# Patient Record
Sex: MALE | Race: WHITE | NOT HISPANIC OR LATINO | Employment: FULL TIME | URBAN - METROPOLITAN AREA
[De-identification: names, ages, dates, MRNs, and addresses within clinical notes are randomized per-mention and may not be internally consistent; named-entity substitution may affect disease eponyms.]

---

## 2019-11-01 ENCOUNTER — APPOINTMENT (EMERGENCY)
Dept: RADIOLOGY | Facility: HOSPITAL | Age: 34
End: 2019-11-01
Payer: COMMERCIAL

## 2019-11-01 ENCOUNTER — HOSPITAL ENCOUNTER (EMERGENCY)
Facility: HOSPITAL | Age: 34
Discharge: HOME | End: 2019-11-01
Attending: EMERGENCY MEDICINE | Admitting: EMERGENCY MEDICINE
Payer: COMMERCIAL

## 2019-11-01 VITALS
WEIGHT: 240 LBS | TEMPERATURE: 98 F | HEIGHT: 73 IN | OXYGEN SATURATION: 99 % | SYSTOLIC BLOOD PRESSURE: 159 MMHG | BODY MASS INDEX: 31.81 KG/M2 | DIASTOLIC BLOOD PRESSURE: 101 MMHG | HEART RATE: 78 BPM | RESPIRATION RATE: 18 BRPM

## 2019-11-01 DIAGNOSIS — M62.838 MUSCLE SPASM: ICD-10-CM

## 2019-11-01 DIAGNOSIS — M25.512 LEFT SHOULDER PAIN: Primary | ICD-10-CM

## 2019-11-01 PROCEDURE — 73030 X-RAY EXAM OF SHOULDER: CPT

## 2019-11-01 PROCEDURE — 99283 EMERGENCY DEPT VISIT LOW MDM: CPT

## 2019-11-01 RX ORDER — METHOCARBAMOL 500 MG/1
500 TABLET, FILM COATED ORAL 2 TIMES DAILY
Qty: 20 TABLET | Refills: 0 | Status: SHIPPED | OUTPATIENT
Start: 2019-11-01 | End: 2020-08-18

## 2019-11-01 RX ORDER — IBUPROFEN 800 MG/1
800 TABLET ORAL 3 TIMES DAILY
Qty: 21 TABLET | Refills: 0 | Status: SHIPPED | OUTPATIENT
Start: 2019-11-01 | End: 2020-08-18

## 2019-11-01 NOTE — ED PROVIDER NOTES
History  Chief Complaint   Patient presents with    Shoulder Pain     unsure of injury, for a few weeks, LEFT side      Lt shoulder pain x 3 weeks has FROM + pulses sensation intact + pulses brisk cap refill, + lt trap spasm, mild tenderness on palpation over a/c and g/h joint areas, no ecchymosis noted          None       History reviewed  No pertinent past medical history  Past Surgical History:   Procedure Laterality Date    HAND TENDON SURGERY         Family History   Problem Relation Age of Onset    Cirrhosis Father     Cirrhosis Maternal Grandfather      I have reviewed and agree with the history as documented  Social History     Tobacco Use    Smoking status: Current Every Day Smoker     Packs/day: 1 00     Types: Cigarettes    Smokeless tobacco: Never Used   Substance Use Topics    Alcohol use: No    Drug use: No        Review of Systems   Musculoskeletal:        Lt shoulder pain       Physical Exam  Physical Exam   Constitutional: He is oriented to person, place, and time  He appears well-developed and well-nourished  HENT:   Head: Normocephalic and atraumatic  Eyes: Pupils are equal, round, and reactive to light  Conjunctivae and EOM are normal    Neck: Normal range of motion  Neck supple    + lt trap spasm   Musculoskeletal: Normal range of motion  He exhibits tenderness  FROM + pulses sensation intact + pulses brisk cap refill, + lt trap spasm, mild tenderness on palpation over a/c and g/h joint areas, no ecchymosis noted     Neurological: He is alert and oriented to person, place, and time  Skin: Skin is warm and dry  Capillary refill takes less than 2 seconds  Psychiatric: He has a normal mood and affect  Nursing note and vitals reviewed        Vital Signs  ED Triage Vitals [11/01/19 1843]   Temperature Pulse Respirations Blood Pressure SpO2   98 °F (36 7 °C) 78 18 (!) 159/101 99 %      Temp Source Heart Rate Source Patient Position - Orthostatic VS BP Location FiO2 (%) Temporal Monitor Sitting Left arm --      Pain Score       7           Vitals:    11/01/19 1843   BP: (!) 159/101   Pulse: 78   Patient Position - Orthostatic VS: Sitting         Visual Acuity      ED Medications  Medications - No data to display    Diagnostic Studies  Results Reviewed     None                 XR shoulder 2+ views LEFT    (Results Pending)              Procedures  Splint application  Date/Time: 11/1/2019 7:04 PM  Performed by: NICOLE Savage  Authorized by: Ruddy Savage [facility]      Patient location:  ED  Procedure performed by emergency physician: No    Other Assisting Provider: No    Consent:     Consent obtained:  Verbal    Consent given by:  Patient    Risks discussed:  Swelling, pain and numbness    Alternatives discussed:  No treatment  Universal protocol:     Patient identity confirmed:  Verbally with patient  Indication:     Indications: other medical problem (comment)    Pre-procedure details:     Sensation:  Normal  Procedure details:     Laterality:  Left    Location:  Shoulder    Shoulder:  L shoulder    Strapping: no      Supplies:  Sling  Post-procedure details:     Pain:  Improved    Sensation:  Normal    Neurovascular Exam: skin pink, capillary refill <2 sec, normal pulses and skin intact, warm, and dry      Patient tolerance of procedure:   Tolerated well, no immediate complications           ED Course  ED Course as of Nov 01 1909 Fri Nov 01, 2019   1905 Outpt mgt with pcp ortho                                  MDM    Disposition  Final diagnoses:   Left shoulder pain   Muscle spasm     Time reflects when diagnosis was documented in both MDM as applicable and the Disposition within this note     Time User Action Codes Description Comment    11/1/2019  7:03 PM Kuldeep Shingles Add [M25 512] Left shoulder pain     11/1/2019  7:04 PM Kuldeep Shingles Add [S98 501] Muscle spasm       ED Disposition     ED Disposition Condition Date/Time Comment    Discharge Stable Fri Nov 1, 2019 7:02 PM Heidi Santos discharge to home/self care  Follow-up Information     Follow up With Specialties Details Why Contact Info    Luis Tsai MD Family Medicine   [address] [address] Zahra Gonzales DO Orthopedic Surgery   [address]  [facility]  [address] [address]  [address] [facility] [address]            Patient's Medications   Discharge Prescriptions    IBUPROFEN (MOTRIN) 800 MG TABLET    Take 1 tablet (800 mg total) by mouth 3 (three) times a day       Start Date: 11/1/2019 End Date: --       Order Dose: 800 mg       Quantity: 21 tablet    Refills: 0    METHOCARBAMOL (ROBAXIN) 500 MG TABLET    Take 1 tablet (500 mg total) by mouth 2 (two) times a day       Start Date: 11/1/2019 End Date: --       Order Dose: 500 mg       Quantity: 20 tablet    Refills: 0     No discharge procedures on file      ED Provider  Electronically Signed by           NICOLE Montes De Oca  11/01/19 6 [address]   11/01/19 1904

## 2020-08-15 ENCOUNTER — APPOINTMENT (EMERGENCY)
Dept: RADIOLOGY | Facility: HOSPITAL | Age: 35
End: 2020-08-15
Payer: COMMERCIAL

## 2020-08-15 ENCOUNTER — HOSPITAL ENCOUNTER (EMERGENCY)
Facility: HOSPITAL | Age: 35
Discharge: HOME | End: 2020-08-15
Attending: EMERGENCY MEDICINE | Admitting: EMERGENCY MEDICINE
Payer: COMMERCIAL

## 2020-08-15 VITALS
SYSTOLIC BLOOD PRESSURE: 128 MMHG | TEMPERATURE: 97.5 F | WEIGHT: 260 LBS | RESPIRATION RATE: 16 BRPM | OXYGEN SATURATION: 99 % | BODY MASS INDEX: 34.46 KG/M2 | DIASTOLIC BLOOD PRESSURE: 70 MMHG | HEIGHT: 73 IN | HEART RATE: 74 BPM

## 2020-08-15 DIAGNOSIS — M67.89 EXTENSOR TENDON DISRUPTION: Primary | ICD-10-CM

## 2020-08-15 PROCEDURE — 99283 EMERGENCY DEPT VISIT LOW MDM: CPT

## 2020-08-15 PROCEDURE — 99284 EMERGENCY DEPT VISIT MOD MDM: CPT | Performed by: EMERGENCY MEDICINE

## 2020-08-15 PROCEDURE — 73130 X-RAY EXAM OF HAND: CPT

## 2020-08-15 RX ORDER — OXYCODONE HYDROCHLORIDE AND ACETAMINOPHEN 5; 325 MG/1; MG/1
1 TABLET ORAL ONCE
Status: COMPLETED | OUTPATIENT
Start: 2020-08-15 | End: 2020-08-15

## 2020-08-15 RX ADMIN — OXYCODONE HYDROCHLORIDE AND ACETAMINOPHEN 1 TABLET: 5; 325 TABLET ORAL at 23:45

## 2020-08-16 NOTE — ED PROVIDER NOTES
History  Chief Complaint   Patient presents with    Hand Pain     Left 5th digit     This is a 68-year-old male who reports left pinky pain distally which started just prior to his arrival in the emergency department  Patient states he was making a balled fist when he suddenly experienced pain  He rates it as initially severe although currently mild to moderate  Patient notes there was some crepitus with the initial injury happened  At worse with movement, better with rest and extension  He has never had anything like this before in this finger but does note that he has had previous tendon injury secondary to a a major laceration of his hand and forearm  Prior to Admission Medications   Prescriptions Last Dose Informant Patient Reported? Taking?   ibuprofen (MOTRIN) 800 mg tablet   No No   Sig: Take 1 tablet (800 mg total) by mouth 3 (three) times a day   methocarbamol (ROBAXIN) 500 mg tablet Not Taking at Unknown time  No No   Sig: Take 1 tablet (500 mg total) by mouth 2 (two) times a day   Patient not taking: Reported on 8/15/2020      Facility-Administered Medications: None       History reviewed  No pertinent past medical history  Past Surgical History:   Procedure Laterality Date    HAND TENDON SURGERY         Family History   Problem Relation Age of Onset    Cirrhosis Father     Cirrhosis Maternal Grandfather      I have reviewed and agree with the history as documented  E-Cigarette/Vaping    E-Cigarette Use Never User      E-Cigarette/Vaping Substances    Nicotine No     THC No     CBD No     Flavoring No     Other No     Unknown No      Social History     Tobacco Use    Smoking status: Current Every Day Smoker     Packs/day: 1 00     Types: Cigarettes    Smokeless tobacco: Never Used   Substance Use Topics    Alcohol use:  Yes     Alcohol/week: 36 0 standard drinks     Types: 36 Cans of beer per week     Frequency: Monthly or less     Drinks per session: 5 or 6     Binge frequency: Less than monthly    Drug use: No       Review of Systems   Constitutional: Negative for chills and fever  Eyes: Negative for visual disturbance  Respiratory: Negative for shortness of breath  Cardiovascular: Negative for chest pain  Gastrointestinal: Negative for abdominal distention and abdominal pain  Endocrine: Negative for polyuria  Genitourinary: Negative for decreased urine volume and dysuria  Neurological: Negative for dizziness, syncope and weakness  Physical Exam  Physical Exam  Constitutional:       General: He is not in acute distress  Appearance: He is well-developed  HENT:      Head: Normocephalic and atraumatic  Eyes:      Conjunctiva/sclera: Conjunctivae normal       Pupils: Pupils are equal, round, and reactive to light  Neck:      Musculoskeletal: Normal range of motion and neck supple  Cardiovascular:      Rate and Rhythm: Normal rate and regular rhythm  Heart sounds: Normal heart sounds  Pulmonary:      Effort: Pulmonary effort is normal  No respiratory distress  Breath sounds: Normal breath sounds  No stridor  No wheezing, rhonchi or rales  Abdominal:      General: Bowel sounds are normal       Palpations: Abdomen is soft  Musculoskeletal: Normal range of motion  Comments: Deformity of the pinky which appears to be consistent with an extensor tendon injury  Patient is unable to extend the 5th digit on the left hand fully  Is still able to flex without too much difficulty  That does cause some pain so there is some limitation due to that  Full sensation to touch and cap refill less than 2 seconds on the left hand  Skin:     General: Skin is warm and dry  Neurological:      Mental Status: He is alert and oriented to person, place, and time  Psychiatric:         Behavior: Behavior normal          Thought Content:  Thought content normal          Judgment: Judgment normal          Vital Signs  ED Triage Vitals [08/15/20 2220] Temperature Pulse Respirations Blood Pressure SpO2   (!) 97 4 °F (36 3 °C) 80 16 131/71 99 %      Temp Source Heart Rate Source Patient Position - Orthostatic VS BP Location FiO2 (%)   Temporal Monitor Sitting Right arm --      Pain Score       2           Vitals:    08/15/20 2220 08/15/20 2355   BP: 131/71 128/70   Pulse: 80 74   Patient Position - Orthostatic VS: Sitting Sitting         Visual Acuity      ED Medications  Medications   oxyCODONE-acetaminophen (PERCOCET) 5-325 mg per tablet 1 tablet (1 tablet Oral Given 8/15/20 2345)       Diagnostic Studies  Results Reviewed     None                 XR hand 3+ views LEFT   ED Interpretation by Lindsay Burnett MD (08/15 2330)   naf                 Procedures  Procedures         ED Course       US AUDIT      Most Recent Value   Initial Alcohol Screen: US AUDIT-C    1  How often do you have a drink containing alcohol? 1 Filed at: 08/15/2020 2228   2  How many drinks containing alcohol do you have on a typical day you are drinking? 4 Filed at: 08/15/2020 2228   3a  Male UNDER 65: How often do you have five or more drinks on one occasion? 1 Filed at: 08/15/2020 2228   Audit-C Score  6 Filed at: 08/15/2020 2228   Full Alcohol Screen: US AUDIT   4  How often during the last year have you found that you were not able to stop drinking once you had started? 0 Filed at: 08/15/2020 2228   5  How often during past year have you failed to do what was normally expected of you because of drinking? 0 Filed at: 08/15/2020 2228   6  How often in past year have you needed a first drink in the morning to get yourself going after a heavy drinking session? 0 Filed at: 08/15/2020 2228   7  How often in past year have you had feeling of guilt or remorse after drinking? 0 Filed at: 08/15/2020 2228   8  How often in past year have you been unable to remember what happened night before because you had been drinking? 0 Filed at: 08/15/2020 2228   9   Have you or someone else been injured as a result of your drinking? 0 Filed at: 08/15/2020 2228   10  Has a relative, friend, doctor or other health worker been concerned about your drinking and suggested you cut down?   0 Filed at: 08/15/2020 2228   AUDIT Total Score  6 Filed at: 08/15/2020 2228                                            [facility]  Number of Diagnoses or Management Options  Extensor tendon disruption: new and requires workup  Diagnosis management comments: This is a 30-year-old man with a probable extensor tendon disruption  Case discussed with orthopedics on-call through [facility]  Dr Mary Galeano is group agreed that patient can be seen outpatient on Monday 1st thing in the morning  Patient was given a splint  At patient's pain controlled with 1 Percocet  Patient appeared clinically stable time of discharge  Repeatedly discussed importance of very close follow-up with orthopedics as well as giving him a number to contact for Dr Mary Galeano group  Discussed warning signs and symptoms with the patient as well as when to return to the emergency department versus follow up with PC P  Patient states understanding and agreement with the plan  Amount and/or Complexity of Data Reviewed  Tests in the radiology section of CPT®: reviewed and ordered          Disposition  Final diagnoses:   Extensor tendon disruption     Time reflects when diagnosis was documented in both MDM as applicable and the Disposition within this note     Time User Action Codes Description Comment    8/15/2020 11:33 PM Chrystal Perez Add [V92 95] Extensor tendon disruption       ED Disposition     ED Disposition Condition Date/Time Comment    Discharge Stable Sat Aug 15, 2020 11:33 PM Mickeal Velma discharge to home/self care              Follow-up Information     Follow up With Specialties Details Why Contact Info    Alexander Flores MD Orthopedic Surgery Call in 1 day  [address]  [phone]            Discharge Medication List as of 8/15/2020 11:35 PM      CONTINUE these medications which have NOT CHANGED    Details   ibuprofen (MOTRIN) 800 mg tablet Take 1 tablet (800 mg total) by mouth 3 (three) times a day, Starting Fri 11/1/2019, Normal      methocarbamol (ROBAXIN) 500 mg tablet Take 1 tablet (500 mg total) by mouth 2 (two) times a day, Starting Fri 11/1/2019, Normal           No discharge procedures on file      PDMP Review     None          ED Provider  Electronically Signed by           Thomas Conklin MD  08/16/20 2035

## 2020-08-17 ENCOUNTER — TELEPHONE (OUTPATIENT)
Dept: OBGYN CLINIC | Facility: HOSPITAL | Age: 35
End: 2020-08-17

## 2020-08-17 NOTE — TELEPHONE ENCOUNTER
Patient is scheduled to be seen tomorrow with Dr Angela Zhao, he is asking if we are able to fax over on a letter head a request for this patients medical records relating his prior surgery with them  He was advised by [facility] that is the only way he can get this ASA        Fax# [phone]

## 2020-08-17 NOTE — TELEPHONE ENCOUNTER
Patient is scheduled to be seen by Dr Nubia Gama tomorrow in the office for a left hand tendon disruption  He stated that he did have previous surgery to the area about 6-7 years ago but is not sure if he is able to obtain the records before he is going to be seen  The patient is scheduled at 8:30 am on 8/18 is this ok?

## 2020-08-18 ENCOUNTER — OFFICE VISIT (OUTPATIENT)
Dept: OBGYN CLINIC | Facility: CLINIC | Age: 35
End: 2020-08-18
Payer: COMMERCIAL

## 2020-08-18 ENCOUNTER — OFFICE VISIT (OUTPATIENT)
Dept: OCCUPATIONAL THERAPY | Facility: CLINIC | Age: 35
End: 2020-08-18
Payer: COMMERCIAL

## 2020-08-18 VITALS
WEIGHT: 252.6 LBS | HEART RATE: 68 BPM | DIASTOLIC BLOOD PRESSURE: 84 MMHG | SYSTOLIC BLOOD PRESSURE: 138 MMHG | BODY MASS INDEX: 34.21 KG/M2 | HEIGHT: 72 IN

## 2020-08-18 DIAGNOSIS — M66.242 SAGITTAL BAND RUPTURE, EXTENSOR TENDON, NONTRAUMATIC, LEFT: Primary | ICD-10-CM

## 2020-08-18 PROCEDURE — L3933 FO W/O JOINTS CF: HCPCS | Performed by: OCCUPATIONAL THERAPIST

## 2020-08-18 PROCEDURE — 99203 OFFICE O/P NEW LOW 30 MIN: CPT | Performed by: ORTHOPAEDIC SURGERY

## 2020-08-18 RX ORDER — IBUPROFEN 600 MG/1
600 TABLET ORAL EVERY 6 HOURS PRN
Qty: 30 TABLET | Refills: 0 | Status: SHIPPED | OUTPATIENT
Start: 2020-08-18 | End: 2020-12-10 | Stop reason: ALTCHOICE

## 2020-08-18 RX ORDER — CYCLOBENZAPRINE HCL 10 MG
TABLET ORAL
COMMUNITY
End: 2020-08-18

## 2020-08-18 NOTE — LETTER
August 18, 2020     Patient: Raven Bond   YOB: 1985   Date of Visit: 8/18/2020       To Whom it May Concern:    Raven Bond is under my professional care  He was seen in my office on 8/18/2020  He may work with limited use of left hand  Pt must maintain splint no heavy lifting pushing pulling until cleared by physician  If you have any questions or concerns, please don't hesitate to call           Sincerely,          Stephanie Guaman MD        CC: No Recipients

## 2020-08-18 NOTE — PROGRESS NOTES
CHIEF COMPLAINT:  Chief Complaint   Patient presents with    Left Hand - Pain       SUBJECTIVE:  Edgard Daugherty is a 37y o  year old  male who presents to the office for evaluation of his left small finger  Pt had old injury to the dorsal aspect of his of his left hand from a chain saw injury  Patient states that he had 6 surgeries to repair patient states he had significant pain 08/15/2020 when he was making a fist   Patient was seen at the emergency department for evaluation due to his significant pain  Patient has been in splint since emergency department visit 08/15/20  Patient states he continues to have pain in his left small finger at the PIP joint specifically  Patient is taking ibuprofen for pain  PAST MEDICAL HISTORY:  History reviewed  No pertinent past medical history  PAST SURGICAL HISTORY:  Past Surgical History:   Procedure Laterality Date    HAND TENDON SURGERY         FAMILY HISTORY:  Family History   Problem Relation Age of Onset    Cirrhosis Father     Cirrhosis Maternal Grandfather        SOCIAL HISTORY:  Social History     Tobacco Use    Smoking status: Current Every Day Smoker     Packs/day: 1 00     Types: Cigarettes    Smokeless tobacco: Never Used   Substance Use Topics    Alcohol use: Yes     Alcohol/week: 36 0 standard drinks     Types: 36 Cans of beer per week     Frequency: Monthly or less     Drinks per session: 5 or 6     Binge frequency: Less than monthly    Drug use: No       MEDICATIONS:  No current outpatient medications on file  ALLERGIES:  Allergies   Allergen Reactions    Morphine GI Intolerance       REVIEW OF SYSTEMS:  Review of Systems   Constitutional: Negative for chills, fever and unexpected weight change  HENT: Negative for hearing loss, nosebleeds and sore throat  Eyes: Negative for pain, redness and visual disturbance  Respiratory: Negative for cough, shortness of breath and wheezing      Cardiovascular: Negative for chest pain, palpitations and leg swelling  Gastrointestinal: Negative for abdominal pain, nausea and vomiting  Endocrine: Negative for polydipsia and polyuria  Genitourinary: Negative for dysuria and hematuria  Skin: Negative for rash and wound  Neurological: Negative for dizziness, light-headedness and headaches  Psychiatric/Behavioral: Negative for decreased concentration, dysphoric mood and suicidal ideas  The patient is not nervous/anxious          VITALS:  Vitals:    08/18/20 0844   BP: 138/84   Pulse: 68       LABS:  HgA1c: No results found for: HGBA1C  BMP: No results found for: GLUCOSE, CALCIUM, NA, K, CO2, CL, BUN, CREATININE    _____________________________________________________  PHYSICAL EXAMINATION:  General: well developed and well nourished, alert, oriented times 3 and appears comfortable  Psychiatric: Normal  HEENT: Trachea Midline, No torticollis  Pulmonary: No audible wheezing or strider  Cardiovascular: No discernable arrhythmia   Skin: No masses, erythema, lacerations, fluctation, ulcerations  Neurovascular: Sensation Intact to the Median, Ulnar, Radial Nerve, Motor Intact to the Median, Ulnar, Radial Nerve and Pulses Intact    MUSCULOSKELETAL EXAMINATION:  Left hand  Scar from skin graft noted over dorsal aspect of hand  FDS, FDP, and extensors intact  Patient is able to extend against resistance  No disruption of the central slip  Pain at PIP joint with passive flexion, resisted extension  No snapping or obvious subluxation of the lateral bands noted    ___________________________________________________  STUDIES REVIEWED:  X-ray of left hand performed to 08/15/2020 shows no acute osseous abnormalities      PROCEDURES PERFORMED:  Procedures  No Procedures performed today    _____________________________________________________  ASSESSMENT/PLAN:  Left finger pain at the PIP joint with possible subluxation of the lateral bands  * order was placed for PIP extension splint  * patient was wear splint at all times with the exception of hygiene purposes  * note was provided for limited duty at work  * patient will follow up in the office in 3 weeks          Follow Up:  Return in about 3 weeks (around 9/8/2020)          To Do Next Visit:  Re-evaluation of current issue        Scribe Attestation    I,:   Satinder Smith am acting as a scribe while in the presence of the attending physician :        I,:   Jame Huynh MD personally performed the services described in this documentation    as scribed in my presence :

## 2020-08-18 NOTE — PROGRESS NOTES
Orthosis    Diagnosis:   1  Sagittal band rupture, extensor tendon, nontraumatic, left       Indication: Motion Blocking    Location: Left  small finger  Supplies: Custom Fit Orthotic  Orthosis type: PIP extension splint  Wearing Schedule: Remove for hygiene only  Describe Position:  PIP extension; DIP/MCP free per MD    Precautions: Universal (skin contact/breakdown)    Patient or Caregiver expresses understanding of wearing Schedule and Precautions? Yes  Patient or Caregiver able to don/doff orthotic independently? Yes    Written orders provided to patient?  Yes  Orders Obtained: Written  Orders Obtained from:  Dr Michelle Jean    Return for evaluation and treatment No

## 2020-12-10 ENCOUNTER — OFFICE VISIT (OUTPATIENT)
Dept: FAMILY MEDICINE CLINIC | Facility: CLINIC | Age: 35
End: 2020-12-10
Payer: COMMERCIAL

## 2020-12-10 VITALS
DIASTOLIC BLOOD PRESSURE: 96 MMHG | OXYGEN SATURATION: 98 % | HEART RATE: 78 BPM | HEIGHT: 73 IN | WEIGHT: 231.6 LBS | RESPIRATION RATE: 18 BRPM | TEMPERATURE: 98.4 F | SYSTOLIC BLOOD PRESSURE: 142 MMHG | BODY MASS INDEX: 30.69 KG/M2

## 2020-12-10 DIAGNOSIS — F51.05 INSOMNIA DUE TO OTHER MENTAL DISORDER: ICD-10-CM

## 2020-12-10 DIAGNOSIS — F32.1 CURRENT MODERATE EPISODE OF MAJOR DEPRESSIVE DISORDER WITHOUT PRIOR EPISODE (HCC): ICD-10-CM

## 2020-12-10 DIAGNOSIS — R03.0 ELEVATED BP WITHOUT DIAGNOSIS OF HYPERTENSION: ICD-10-CM

## 2020-12-10 DIAGNOSIS — F99 INSOMNIA DUE TO OTHER MENTAL DISORDER: ICD-10-CM

## 2020-12-10 DIAGNOSIS — R45.86 MOOD SWINGS: ICD-10-CM

## 2020-12-10 DIAGNOSIS — Z00.00 ENCOUNTER FOR MEDICAL EXAMINATION TO ESTABLISH CARE: Primary | ICD-10-CM

## 2020-12-10 PROCEDURE — 99203 OFFICE O/P NEW LOW 30 MIN: CPT | Performed by: FAMILY MEDICINE

## 2020-12-10 RX ORDER — FLUOXETINE HYDROCHLORIDE 20 MG/1
20 CAPSULE ORAL DAILY
Qty: 30 CAPSULE | Refills: 2 | Status: SHIPPED | OUTPATIENT
Start: 2020-12-10 | End: 2020-12-23 | Stop reason: ALTCHOICE

## 2020-12-23 ENCOUNTER — TELEMEDICINE (OUTPATIENT)
Dept: FAMILY MEDICINE CLINIC | Facility: CLINIC | Age: 35
End: 2020-12-23
Payer: COMMERCIAL

## 2020-12-23 DIAGNOSIS — F32.1 CURRENT MODERATE EPISODE OF MAJOR DEPRESSIVE DISORDER WITHOUT PRIOR EPISODE (HCC): Primary | ICD-10-CM

## 2020-12-23 PROCEDURE — 99213 OFFICE O/P EST LOW 20 MIN: CPT | Performed by: FAMILY MEDICINE

## 2020-12-23 RX ORDER — CITALOPRAM 20 MG/1
20 TABLET ORAL DAILY
Qty: 30 TABLET | Refills: 2 | Status: SHIPPED | OUTPATIENT
Start: 2020-12-23

## 2021-04-26 ENCOUNTER — TELEMEDICINE (OUTPATIENT)
Dept: FAMILY MEDICINE CLINIC | Facility: CLINIC | Age: 36
End: 2021-04-26
Payer: COMMERCIAL

## 2021-04-26 DIAGNOSIS — B34.9 VIRAL INFECTION, UNSPECIFIED: Primary | ICD-10-CM

## 2021-04-26 DIAGNOSIS — B34.9 VIRAL INFECTION, UNSPECIFIED: ICD-10-CM

## 2021-04-26 PROCEDURE — U0003 INFECTIOUS AGENT DETECTION BY NUCLEIC ACID (DNA OR RNA); SEVERE ACUTE RESPIRATORY SYNDROME CORONAVIRUS 2 (SARS-COV-2) (CORONAVIRUS DISEASE [COVID-19]), AMPLIFIED PROBE TECHNIQUE, MAKING USE OF HIGH THROUGHPUT TECHNOLOGIES AS DESCRIBED BY CMS-2020-01-R: HCPCS | Performed by: PHYSICIAN ASSISTANT

## 2021-04-26 PROCEDURE — G0071 COMM SVCS BY RHC/FQHC 5 MIN: HCPCS | Performed by: FAMILY MEDICINE

## 2021-04-26 PROCEDURE — U0005 INFEC AGEN DETEC AMPLI PROBE: HCPCS | Performed by: PHYSICIAN ASSISTANT

## 2021-04-26 NOTE — PROGRESS NOTES
COVID-19 Outpatient Progress Note    Assessment/Plan:    Problem List Items Addressed This Visit     None      Visit Diagnoses     Viral infection, unspecified    -  Primary    Relevant Orders    Novel Coronavirus (COVID-19), PCR [facility] Collected at [facility] or [facility]         Disposition:     I recommended self-quarantine for 14 days and to call back for worsening symptoms or development of shortness of breath  I referred patient to one of our centralized sites for a COVID-19 swab  COVID test ordered  Advised to continue tylenol/mucinex PRN  Will follow up with results  I have spent 10 minutes directly with the patient  Encounter provider Daniel De Paz PA-C    Provider located at [address] [place] [address] Drive    Recent Visits  No visits were found meeting these conditions  Showing recent visits within past 7 days and meeting all other requirements     Today's Visits  Date Type Provider Dept   04/26/21 Telemedicine MATEUS Blevins  Cln   Showing today's visits and meeting all other requirements     Future Appointments  No visits were found meeting these conditions  Showing future appointments within next 150 days and meeting all other requirements        Patient agrees to participate in a virtual check in via telephone or video visit instead of presenting to the office to address urgent/immediate medical needs  Patient is aware this is a billable service  After connecting through Telephone, the patient was identified by name and date of birth  Cathy Gong was informed that this was a telemedicine visit and that the exam was being conducted confidentially over secure lines  My office door was closed  No one else was in the room  Cathy Gong acknowledged consent and understanding of privacy and security of the telemedicine visit   I informed the patient that I have reviewed his record in [address] [place] [address] and presented the opportunity for him to ask any questions regarding the visit today  The patient agreed to participate  It was my intent to perform this visit via video technology but the patient was not able to do a video connection so the visit was completed via audio telephone only  Subjective:   Salo Cortes is a 31 y o  male who is concerned about COVID-19  Patient's symptoms include fever (99 9), chills, fatigue, nasal congestion, rhinorrhea, sore throat, cough and vomiting (x1)  Patient denies anosmia, loss of taste, shortness of breath, chest tightness, abdominal pain, nausea, diarrhea, myalgias and headaches  Date of symptom onset: 4/24/2021    Exposure:   Contact with a person who is under investigation (PUI) for or who is positive for COVID-19 within the last 14 days?: No    Hospitalized recently for fever and/or lower respiratory symptoms?: No      Currently a healthcare worker that is involved in direct patient care?: No      Works in a special setting where the risk of COVID-19 transmission may be high? (this may include long-term care, correctional and prison facilities; homeless shelters; assisted-living facilities and group homes ): No      Resident in a special setting where the risk of COVID-19 transmission may be high? (this may include long-term care, correctional and prison facilities; homeless shelters; assisted-living facilities and group homes ): No      Patient endorses the above symptoms which began 4/24  He denies known COVID contacts  Has been taking mucinex and tylenol for his symptoms  No results found for: [address] [facility], [address] [facility] [address]  History reviewed  No pertinent past medical history    Past Surgical History:   Procedure Laterality Date    HAND TENDON SURGERY       Current Outpatient Medications   Medication Sig Dispense Refill    citalopram (CeleXA) 20 mg tablet Take 1 tablet (20 mg total) by mouth daily 30 tablet 2     No current facility-administered medications for this visit  Allergies   Allergen Reactions    Morphine GI Intolerance       Review of Systems   Constitutional: Positive for chills, fatigue and fever (99 9)  HENT: Positive for congestion, rhinorrhea and sore throat  Respiratory: Positive for cough  Negative for chest tightness and shortness of breath  Gastrointestinal: Positive for vomiting (x1)  Negative for abdominal pain, diarrhea and nausea  Musculoskeletal: Negative for myalgias  Neurological: Negative for headaches  VIRTUAL VISIT DISCLAIMER    Kimberly Lam acknowledges that he has consented to an online visit or consultation  He understands that the online visit is based solely on information provided by him, and that, in the absence of a face-to-face physical evaluation by the physician, the diagnosis he receives is both limited and provisional in terms of accuracy and completeness  This is not intended to replace a full medical face-to-face evaluation by the physician  Kimberly Lam understands and accepts these terms

## 2021-04-27 LAB — SARS-COV-2 RNA RESP QL NAA+PROBE: NEGATIVE

## 2022-02-02 ENCOUNTER — TELEPHONE (OUTPATIENT)
Dept: FAMILY MEDICINE CLINIC | Facility: CLINIC | Age: 37
End: 2022-02-02

## 2022-05-27 ENCOUNTER — HOSPITAL ENCOUNTER (EMERGENCY)
Facility: HOSPITAL | Age: 37
Discharge: HOME | End: 2022-05-28
Attending: EMERGENCY MEDICINE
Payer: COMMERCIAL

## 2022-05-27 ENCOUNTER — OFFICE VISIT (OUTPATIENT)
Dept: INTERNAL MEDICINE CLINIC | Facility: CLINIC | Age: 37
End: 2022-05-27
Payer: COMMERCIAL

## 2022-05-27 VITALS
BODY MASS INDEX: 32.07 KG/M2 | WEIGHT: 242 LBS | DIASTOLIC BLOOD PRESSURE: 90 MMHG | OXYGEN SATURATION: 98 % | SYSTOLIC BLOOD PRESSURE: 124 MMHG | HEIGHT: 73 IN | TEMPERATURE: 98.4 F | HEART RATE: 114 BPM

## 2022-05-27 VITALS
DIASTOLIC BLOOD PRESSURE: 75 MMHG | BODY MASS INDEX: 31.81 KG/M2 | HEIGHT: 73 IN | RESPIRATION RATE: 18 BRPM | TEMPERATURE: 98 F | WEIGHT: 240 LBS | OXYGEN SATURATION: 98 % | HEART RATE: 72 BPM | SYSTOLIC BLOOD PRESSURE: 140 MMHG

## 2022-05-27 DIAGNOSIS — F19.20 DRUG DEPENDENCE (HCC): Primary | ICD-10-CM

## 2022-05-27 DIAGNOSIS — Z79.899 ENCOUNTER FOR MONITORING SUBOXONE MAINTENANCE THERAPY: ICD-10-CM

## 2022-05-27 DIAGNOSIS — F11.23 OPIATE WITHDRAWAL (HCC): Primary | ICD-10-CM

## 2022-05-27 DIAGNOSIS — Z51.81 ENCOUNTER FOR MONITORING SUBOXONE MAINTENANCE THERAPY: ICD-10-CM

## 2022-05-27 DIAGNOSIS — Z79.899 MEDICATION THERAPY CONTINUED: ICD-10-CM

## 2022-05-27 PROBLEM — F17.200 SMOKER: Status: ACTIVE | Noted: 2022-05-27

## 2022-05-27 LAB
ANION GAP SERPL CALCULATED.3IONS-SCNC: 9 MMOL/L (ref 4–13)
BASOPHILS # BLD AUTO: 0.04 THOUSANDS/ΜL (ref 0–0.1)
BASOPHILS NFR BLD AUTO: 0 % (ref 0–1)
BILIRUB UR QL STRIP: NEGATIVE
BUN SERPL-MCNC: 16 MG/DL (ref 5–25)
CALCIUM SERPL-MCNC: 9.7 MG/DL (ref 8.4–10.2)
CHLORIDE SERPL-SCNC: 102 MMOL/L (ref 96–108)
CLARITY UR: CLEAR
CO2 SERPL-SCNC: 29 MMOL/L (ref 21–32)
COLOR UR: YELLOW
CREAT SERPL-MCNC: 1.29 MG/DL (ref 0.6–1.3)
EOSINOPHIL # BLD AUTO: 0.05 THOUSAND/ΜL (ref 0–0.61)
EOSINOPHIL NFR BLD AUTO: 1 % (ref 0–6)
ERYTHROCYTE [DISTWIDTH] IN BLOOD BY AUTOMATED COUNT: 12.7 % (ref 11.6–15.1)
GFR SERPL CREATININE-BSD FRML MDRD: 71 ML/MIN/1.73SQ M
GLUCOSE SERPL-MCNC: 120 MG/DL (ref 65–140)
GLUCOSE UR STRIP-MCNC: NEGATIVE MG/DL
HCT VFR BLD AUTO: 45.6 % (ref 36.5–49.3)
HGB BLD-MCNC: 14.9 G/DL (ref 12–17)
HGB UR QL STRIP.AUTO: NEGATIVE
IMM GRANULOCYTES # BLD AUTO: 0.03 THOUSAND/UL (ref 0–0.2)
IMM GRANULOCYTES NFR BLD AUTO: 0 % (ref 0–2)
KETONES UR STRIP-MCNC: NEGATIVE MG/DL
LEUKOCYTE ESTERASE UR QL STRIP: NEGATIVE
LYMPHOCYTES # BLD AUTO: 1.46 THOUSANDS/ΜL (ref 0.6–4.47)
LYMPHOCYTES NFR BLD AUTO: 14 % (ref 14–44)
MCH RBC QN AUTO: 29.6 PG (ref 26.8–34.3)
MCHC RBC AUTO-ENTMCNC: 32.7 G/DL (ref 31.4–37.4)
MCV RBC AUTO: 91 FL (ref 82–98)
MONOCYTES # BLD AUTO: 0.59 THOUSAND/ΜL (ref 0.17–1.22)
MONOCYTES NFR BLD AUTO: 6 % (ref 4–12)
NEUTROPHILS # BLD AUTO: 8.45 THOUSANDS/ΜL (ref 1.85–7.62)
NEUTS SEG NFR BLD AUTO: 79 % (ref 43–75)
NITRITE UR QL STRIP: NEGATIVE
NRBC BLD AUTO-RTO: 0 /100 WBCS
PH UR STRIP.AUTO: 7.5 [PH]
PLATELET # BLD AUTO: 245 THOUSANDS/UL (ref 149–390)
PMV BLD AUTO: 9.2 FL (ref 8.9–12.7)
POTASSIUM SERPL-SCNC: 3.6 MMOL/L (ref 3.5–5.3)
PROT UR STRIP-MCNC: NEGATIVE MG/DL
RBC # BLD AUTO: 5.04 MILLION/UL (ref 3.88–5.62)
SODIUM SERPL-SCNC: 140 MMOL/L (ref 135–147)
SP GR UR STRIP.AUTO: 1.01 (ref 1–1.03)
UROBILINOGEN UR QL STRIP.AUTO: 1 E.U./DL
WBC # BLD AUTO: 10.62 THOUSAND/UL (ref 4.31–10.16)

## 2022-05-27 PROCEDURE — 99448 NTRPROF PH1/NTRNET/EHR 21-30: CPT | Performed by: EMERGENCY MEDICINE

## 2022-05-27 PROCEDURE — 99285 EMERGENCY DEPT VISIT HI MDM: CPT | Performed by: EMERGENCY MEDICINE

## 2022-05-27 PROCEDURE — 85025 COMPLETE CBC W/AUTO DIFF WBC: CPT | Performed by: PHYSICIAN ASSISTANT

## 2022-05-27 PROCEDURE — 99203 OFFICE O/P NEW LOW 30 MIN: CPT | Performed by: INTERNAL MEDICINE

## 2022-05-27 PROCEDURE — 81003 URINALYSIS AUTO W/O SCOPE: CPT | Performed by: PHYSICIAN ASSISTANT

## 2022-05-27 PROCEDURE — 96361 HYDRATE IV INFUSION ADD-ON: CPT

## 2022-05-27 PROCEDURE — 99284 EMERGENCY DEPT VISIT MOD MDM: CPT

## 2022-05-27 PROCEDURE — 80048 BASIC METABOLIC PNL TOTAL CA: CPT | Performed by: PHYSICIAN ASSISTANT

## 2022-05-27 PROCEDURE — 96375 TX/PRO/DX INJ NEW DRUG ADDON: CPT

## 2022-05-27 PROCEDURE — 96376 TX/PRO/DX INJ SAME DRUG ADON: CPT

## 2022-05-27 PROCEDURE — 96374 THER/PROPH/DIAG INJ IV PUSH: CPT

## 2022-05-27 PROCEDURE — 36415 COLL VENOUS BLD VENIPUNCTURE: CPT | Performed by: PHYSICIAN ASSISTANT

## 2022-05-27 RX ORDER — CLONIDINE HYDROCHLORIDE 0.1 MG/1
0.2 TABLET ORAL ONCE
Status: COMPLETED | OUTPATIENT
Start: 2022-05-27 | End: 2022-05-27

## 2022-05-27 RX ORDER — NALOXONE HYDROCHLORIDE 4 MG/.1ML
1 SPRAY NASAL AS NEEDED
Qty: 1 EACH | Refills: 1 | Status: SHIPPED | OUTPATIENT
Start: 2022-05-27 | End: 2022-05-31

## 2022-05-27 RX ORDER — CLONIDINE 0.2 MG/24H
0.2 PATCH, EXTENDED RELEASE TRANSDERMAL WEEKLY
Status: DISCONTINUED | OUTPATIENT
Start: 2022-05-27 | End: 2022-05-28 | Stop reason: HOSPADM

## 2022-05-27 RX ORDER — ONDANSETRON 2 MG/ML
4 INJECTION INTRAMUSCULAR; INTRAVENOUS ONCE
Status: DISCONTINUED | OUTPATIENT
Start: 2022-05-27 | End: 2022-05-28 | Stop reason: HOSPADM

## 2022-05-27 RX ORDER — BUPRENORPHINE AND NALOXONE 8; 2 MG/1; MG/1
16 FILM, SOLUBLE BUCCAL; SUBLINGUAL ONCE
Status: COMPLETED | OUTPATIENT
Start: 2022-05-27 | End: 2022-05-27

## 2022-05-27 RX ORDER — HALOPERIDOL 5 MG/ML
2 INJECTION INTRAMUSCULAR ONCE
Status: COMPLETED | OUTPATIENT
Start: 2022-05-27 | End: 2022-05-27

## 2022-05-27 RX ORDER — ONDANSETRON 4 MG/1
4 TABLET, ORALLY DISINTEGRATING ORAL ONCE
Status: COMPLETED | OUTPATIENT
Start: 2022-05-27 | End: 2022-05-27

## 2022-05-27 RX ORDER — LORAZEPAM 2 MG/ML
1 INJECTION INTRAMUSCULAR ONCE
Status: COMPLETED | OUTPATIENT
Start: 2022-05-27 | End: 2022-05-27

## 2022-05-27 RX ORDER — BUPRENORPHINE AND NALOXONE 8; 2 MG/1; MG/1
8 FILM, SOLUBLE BUCCAL; SUBLINGUAL DAILY
Status: DISCONTINUED | OUTPATIENT
Start: 2022-05-28 | End: 2022-05-28 | Stop reason: HOSPADM

## 2022-05-27 RX ORDER — BUPRENORPHINE AND NALOXONE 8; 2 MG/1; MG/1
8 FILM, SOLUBLE BUCCAL; SUBLINGUAL ONCE
Status: DISCONTINUED | OUTPATIENT
Start: 2022-05-27 | End: 2022-05-28 | Stop reason: HOSPADM

## 2022-05-27 RX ORDER — BUPRENORPHINE AND NALOXONE 8; 2 MG/1; MG/1
FILM, SOLUBLE BUCCAL; SUBLINGUAL
Qty: 28 FILM | Refills: 0 | Status: SHIPPED | OUTPATIENT
Start: 2022-05-27

## 2022-05-27 RX ORDER — BUPRENORPHINE AND NALOXONE 8; 2 MG/1; MG/1
FILM, SOLUBLE BUCCAL; SUBLINGUAL
Qty: 2 FILM | Refills: 0 | Status: SHIPPED | OUTPATIENT
Start: 2022-05-27 | End: 2022-05-27

## 2022-05-27 RX ORDER — LORAZEPAM 2 MG/ML
2 INJECTION INTRAMUSCULAR ONCE
Status: COMPLETED | OUTPATIENT
Start: 2022-05-27 | End: 2022-05-27

## 2022-05-27 RX ADMIN — CLONIDINE 0.2 MG: 0.2 PATCH TRANSDERMAL at 17:24

## 2022-05-27 RX ADMIN — ONDANSETRON 4 MG: 4 TABLET, ORALLY DISINTEGRATING ORAL at 15:51

## 2022-05-27 RX ADMIN — LORAZEPAM 2 MG: 2 INJECTION INTRAMUSCULAR; INTRAVENOUS at 21:44

## 2022-05-27 RX ADMIN — HALOPERIDOL LACTATE 2 MG: 5 INJECTION, SOLUTION INTRAMUSCULAR at 20:56

## 2022-05-27 RX ADMIN — SODIUM CHLORIDE 1000 ML: 0.9 INJECTION, SOLUTION INTRAVENOUS at 17:25

## 2022-05-27 RX ADMIN — CLONIDINE HYDROCHLORIDE 0.2 MG: 0.1 TABLET ORAL at 15:38

## 2022-05-27 RX ADMIN — LORAZEPAM 1 MG: 2 INJECTION INTRAMUSCULAR; INTRAVENOUS at 17:29

## 2022-05-27 RX ADMIN — CLONIDINE HYDROCHLORIDE 0.2 MG: 0.1 TABLET ORAL at 16:25

## 2022-05-27 RX ADMIN — BUPRENORPHINE AND NALOXONE 16 MG: 8; 2 FILM BUCCAL; SUBLINGUAL at 19:32

## 2022-05-27 NOTE — PROGRESS NOTES
Assessment/Plan:  Problem List Items Addressed This Visit        Other    Medication therapy continued    Relevant Medications    naloxone (Narcan) 4 mg/0 1 mL nasal spray    buprenorphine-naloxone (Suboxone) 8-2 mg    Other Relevant Orders    [facility] All Prescribed Meds    Amphetamines, Methamphetamines    Butalbital    Phenobarbital    Secobarbital    Temazepam    Alprazolam    Clonazepam    Diazepam    Lorazepam    Oxazepam    Gabapentin    Pregabalin    Cocaine    Heroin    Buprenorphine    Levorphanol    Meperidine    Naltrexone    Fentanyl    Methadone    Oxycodone    Oxymorphone    Tapentadol    Tramadol    Codeine, Hydrocodone, Hydropmorphone, Morphine    Bath Salts    Kratom    Spice    Methylphenidate    Phentermine    Validity Oxidant    Validity Creatinine    Validity pH    Validity Specific    MM DT_Buprenorphine Definitive Test      Other Visit Diagnoses     Drug dependence (HCC)    -  Primary    Relevant Medications    naloxone (Narcan) 4 mg/0 1 mL nasal spray    buprenorphine-naloxone (Suboxone) 8-2 mg    Other Relevant Orders    [facility] All Prescribed Meds    Amphetamines, Methamphetamines    Butalbital    Phenobarbital    Secobarbital    Temazepam    Alprazolam    Clonazepam    Diazepam    Lorazepam    Oxazepam    Gabapentin    Pregabalin    Cocaine    Heroin    Buprenorphine    Levorphanol    Meperidine    Naltrexone    Fentanyl    Methadone    Oxycodone    Oxymorphone    Tapentadol    Tramadol    Codeine, Hydrocodone, Hydropmorphone, Morphine    Bath Salts    Kratom    Spice    Methylphenidate    Phentermine    Validity Oxidant    Validity Creatinine    Validity pH    Validity Specific    MM DT_Buprenorphine Definitive Test    Encounter for monitoring Suboxone maintenance therapy        Relevant Medications    naloxone (Narcan) 4 mg/0 1 mL nasal spray    buprenorphine-naloxone (Suboxone) 8-2 mg    Other Relevant Orders    [facility] All Prescribed Meds    Amphetamines, Methamphetamines Butalbital    Phenobarbital    Secobarbital    Temazepam    Alprazolam    Clonazepam    Diazepam    Lorazepam    Oxazepam    Gabapentin    Pregabalin    Cocaine    Heroin    Buprenorphine    Levorphanol    Meperidine    Naltrexone    Fentanyl    Methadone    Oxycodone    Oxymorphone    Tapentadol    Tramadol    Codeine, Hydrocodone, Hydropmorphone, Morphine    Bath Salts    Kratom    Spice    Methylphenidate    Phentermine    Validity Oxidant    Validity Creatinine    Validity pH    Validity Specific    MM DT_Buprenorphine Definitive Test           Diagnoses and all orders for this visit:    Drug dependence ([facility] [address] )  -     Discontinue: buprenorphine-naloxone (Suboxone) 8-2 mg; Return to the office with the med for dosing   -     naloxone (Narcan) 4 mg/0 1 mL nasal spray; 0 1 mL (4 mg total) into each nostril as needed (respiratory depression or possible OD)  -     [facility] All Prescribed Meds  -     Amphetamines, Methamphetamines  -     Butalbital  -     Phenobarbital  -     Secobarbital  -     Temazepam  -     Alprazolam  -     Clonazepam  -     Diazepam  -     Lorazepam  -     Oxazepam  -     Gabapentin  -     Pregabalin  -     Cocaine  -     Heroin  -     Buprenorphine  -     Levorphanol  -     Meperidine  -     Naltrexone  -     Fentanyl  -     Methadone  -     Oxycodone  -     Oxymorphone  -     Tapentadol  -     Tramadol  -     Codeine, Hydrocodone, Hydropmorphone, Morphine  -     Bath Salts  -     Kratom  -     Spice  -     Methylphenidate  -     Phentermine  -     Validity Oxidant  -     Validity Creatinine  -     Validity pH  -     Validity Specific  -     MM DT_Buprenorphine Definitive Test  -     buprenorphine-naloxone (Suboxone) 8-2 mg; One or two strips sl q day      Encounter for monitoring Suboxone maintenance therapy  -     Discontinue: buprenorphine-naloxone (Suboxone) 8-2 mg; Return to the office with the med for dosing   -     naloxone (Narcan) 4 mg/0 1 mL nasal spray; 0 1 mL (4 mg total) into each nostril as needed (respiratory depression or possible OD)  -     [facility] All Prescribed Meds  -     Amphetamines, Methamphetamines  -     Butalbital  -     Phenobarbital  -     Secobarbital  -     Temazepam  -     Alprazolam  -     Clonazepam  -     Diazepam  -     Lorazepam  -     Oxazepam  -     Gabapentin  -     Pregabalin  -     Cocaine  -     Heroin  -     Buprenorphine  -     Levorphanol  -     Meperidine  -     Naltrexone  -     Fentanyl  -     Methadone  -     Oxycodone  -     Oxymorphone  -     Tapentadol  -     Tramadol  -     Codeine, Hydrocodone, Hydropmorphone, Morphine  -     Bath Salts  -     Kratom  -     Spice  -     Methylphenidate  -     Phentermine  -     Validity Oxidant  -     Validity Creatinine  -     Validity pH  -     Validity Specific  -     MM DT_Buprenorphine Definitive Test  -     buprenorphine-naloxone (Suboxone) 8-2 mg; One or two strips sl q day      Medication therapy continued  -     Discontinue: buprenorphine-naloxone (Suboxone) 8-2 mg; Return to the office with the med for dosing   -     naloxone (Narcan) 4 mg/0 1 mL nasal spray; 0 1 mL (4 mg total) into each nostril as needed (respiratory depression or possible OD)  -     [facility] Med[facility]  -     Amphetamines, Methamphetamines  -     Butalbital  -     Phenobarbital  -     Secobarbital  -     Temazepam  -     Alprazolam  -     Clonazepam  -     Diazepam  -     Lorazepam  -     Oxazepam  -     Gabapentin  -     Pregabalin  -     Cocaine  -     Heroin  -     Buprenorphine  -     Levorphanol  -     Meperidine  -     Naltrexone  -     Fentanyl  -     Methadone  -     Oxycodone  -     Oxymorphone  -     Tapentadol  -     Tramadol  -     Codeine, Hydrocodone, Hydropmorphone, Morphine  -     Bath Salts  -     Kratom  -     Spice  -     Methylphenidate  -     Phentermine  -     Validity Oxidant  -     Validity Creatinine  -     Validity pH  -     Validity Specific  -     MM DT_Buprenorphine Definitive Test  - buprenorphine-naloxone (Suboxone) 8-2 mg; One or two strips sl q day  No problem-specific Assessment & Plan notes found for this encounter  A/P: RTC with the med and was dosed  After about 15 minutes, started to have worsening chills and sweating  Re-confirms that he last used 1100 yesterday and only uses 4-5 bags/day  UDT obtained  Will refer to the ER for IVF"s, zofran and other supportive measures  Will send in 16mg films  Narcan script give to pt to have on hand  Reminded to keep meds safe and our of reach of children  RTC two weeks for f/u  Subjective:      Patient ID: Lani Irvin is a 30 y o  male  WM pt of PA Ms Dennis Carr, presents with his wife to start suboxone  Pt reports orally abusing prescription narcs about ten years ago after suffering a lower back issue  Stopped and was clean for 9 years, but then developed depression and when prescription meds didn't work,turned to nasally abusing herion  Max'd at 4 bags/day  Last used 1100 yesterday  IN withdraw  Denies trouble with the law and no OD's  No detox or counseling  NO IVDA  The following portions of the patient's history were reviewed and updated as appropriate:   He has no past medical history on file  ,  does not have any pertinent problems on file  ,   has a past surgical history that includes Hand tendon surgery  ,  family history includes Alcohol abuse in his father; Cirrhosis in his father and maternal grandfather; Substance Abuse in his mother; Throat cancer in his family  ,   reports that he has been smoking cigarettes  He has a 20 00 pack-year smoking history  He has never used smokeless tobacco  He reports current alcohol use  He reports current drug use  Drug: Heroin  ,  is allergic to morphine     Current Outpatient Medications   Medication Sig Dispense Refill    buprenorphine-naloxone (Suboxone) 8-2 mg One or two strips sl q day   28 Film 0    naloxone (Narcan) 4 mg/0 1 mL nasal spray 0 1 mL (4 mg total) into each nostril as needed (respiratory depression or possible OD) 1 each 1    citalopram (CeleXA) 20 mg tablet Take 1 tablet (20 mg total) by mouth daily (Patient not taking: Reported on 5/27/2022) 30 tablet 2     No current facility-administered medications for this visit  Review of Systems   Constitutional: Positive for activity change, chills, diaphoresis and fatigue  Negative for fever  HENT: Positive for rhinorrhea  Eyes: Negative for visual disturbance  Respiratory: Negative for cough, chest tightness, shortness of breath and wheezing  Cardiovascular: Negative for chest pain, palpitations and leg swelling  Gastrointestinal: Positive for abdominal pain and nausea  Negative for constipation, diarrhea and vomiting  Endocrine: Negative for cold intolerance and heat intolerance  Genitourinary: Negative for difficulty urinating, dysuria and frequency  Musculoskeletal: Negative for arthralgias, gait problem and myalgias  Neurological: Negative for dizziness, seizures, syncope, weakness, light-headedness and headaches  Psychiatric/Behavioral: Negative for confusion, dysphoric mood and sleep disturbance  The patient is not nervous/anxious  PHQ-2/9 Depression Screening          Objective:  Vitals:    05/27/22 1252   BP: 124/90   Pulse: (!) 114   Temp: 98 4 °F (36 9 °C)   TempSrc: Tympanic   SpO2: 98%   Weight: 110 kg (242 lb)   Height: 6' 1" (1 854 m)     Body mass index is 31 93 kg/m²  Physical Exam  Vitals and nursing note reviewed  Constitutional:       General: He is not in acute distress  Appearance: Normal appearance  He is not ill-appearing  HENT:      Head: Normocephalic and atraumatic  Mouth/Throat:      Mouth: Mucous membranes are moist    Eyes:      Extraocular Movements: Extraocular movements intact  Conjunctiva/sclera: Conjunctivae normal       Pupils: Pupils are equal, round, and reactive to light  Neck:      Vascular: No carotid bruit     Cardiovascular: Rate and Rhythm: Normal rate and regular rhythm  Heart sounds: Normal heart sounds  Pulmonary:      Effort: Pulmonary effort is normal  No respiratory distress  Breath sounds: Normal breath sounds  No wheezing or rales  Abdominal:      General: Bowel sounds are normal  There is no distension  Palpations: Abdomen is soft  Tenderness: There is no abdominal tenderness  Musculoskeletal:      Cervical back: Neck supple  Neurological:      General: No focal deficit present  Mental Status: He is alert and oriented to person, place, and time  Mental status is at baseline  Psychiatric:         Mood and Affect: Mood normal          Behavior: Behavior normal          Thought Content: Thought content normal          Judgment: Judgment normal        Scheduled Medication Review:  Pt's scheduled medication use was reviewed by myself/staff via the [employer] website  Pt's use has been found to be appropriate w/o any concerns for misuse by the patient  Pt's current conditions require continued scheduled medication use at this time  Future review for continued appropriate medication use and misuse will continue

## 2022-05-27 NOTE — PATIENT INSTRUCTIONS
Naloxone (Into the nose)   Naloxone Hydrochloride (nal-OX-one radha-droe-KLOR-keisha)  Treats opioid overdose in an emergency situation  Must be given as soon as possible  Brand Name(s): Kloxxado, Narcasya   There may be other brand names for this medicine  When This Medicine Should Not Be Used: This medicine is not right for everyone  Do not use it if you had an allergic reaction to naloxone  How to Use This Medicine:   Spray  Your doctor will tell you how much medicine to use  Do not use more than directed  Your doctor or a pharmacist can tell you where to buy this medicine  It is available without a doctor's order at most major pharmacies  This medicine must be given to you (the patient) by someone else  Talk with people close to you so they know what to do in case of an emergency  Read and follow the patient instructions that come with this medicine  Talk to your doctor or pharmacist if you have any questions  This medicine is for use only in the nose  Do not get any of it in your eyes or on your skin  If it does get on these areas, rinse it off right away  To use:   Each nasal spray contains only one dose of naloxone  Do not prime or test the nasal spray  Hold the nasal spray with your thumb on the bottom of the plunger and your first and middle fingers on either side of the nozzle  Miguel Loge the patient on his or her back  Support the patient's neck with your hand and let the head tilt back  Gently insert the tip of the nozzle into one nostril, until your fingers on either side of the nozzle are against the bottom of the patient's nose  Press the plunger firmly to give the dose  Remove the nasal spray from the patient's nose  Move the patient on his or her side (recovery position)  Get emergency medical help right away  Watch the patient closely  If needed, you may give more doses every 2 to 3 minutes until the patient responds   Use a new nasal spray for each dose and spray the medicine into the other nostril each time  Store the medicine in a closed container at room temperature, away from heat, moisture, and direct light  Do not freeze or expose to excessive heat  If the spray is frozen and is needed in an emergency, do not wait for the spray to thaw  Get medical help right away  However, the spray may be thawed for 15 minutes in room temperature, and it can still be used if it has been thawed after being frozen before  Ask your pharmacist about the best way to dispose of medicine that you do not use  Drugs and Foods to Avoid:      Ask your doctor or pharmacist before using any other medicine, including over-the-counter medicines, vitamins, and herbal products  Warnings While Using This Medicine:   Tell your doctor if you are pregnant or breastfeeding, or if you have heart or blood vessel disease  This medicine should be given right away after a suspected or known overdose of an opioid (narcotic) medicine  This will help prevent serious breathing problems and severe sleepiness that can lead to death  The effects of the opioid medicine may last longer than the effects of the naloxone  This means the breathing problems and sleepiness could come back  Always call for emergency help after the first dose of naloxone  This medicine could cause withdrawal symptoms from the opioid medicine  Keep all medicine out of the reach of children  Never share your medicine with anyone  Possible Side Effects While Using This Medicine:   Call your doctor right away if you notice any of these side effects:   Allergic reaction: Itching or hives, swelling in your face or hands, swelling or tingling in your mouth or throat, chest tightness, trouble breathing  Crying more than usual (in babies)  Diarrhea, nausea, vomiting, stomach cramps or pain  Fast, pounding, or uneven heartbeat  Fever, runny nose, sneezing, sweating, yawning, discomfort in the nose  Lightheadedness, dizziness, fainting  Ongoing trouble breathing  Seizure, shaking, or feeling restless, nervous, or irritable  Unusual tiredness or weakness  If you notice these less serious side effects, talk with your doctor:   Headache  Joint or muscle pain  If you notice other side effects that you think are caused by this medicine, tell your doctor  Call your doctor for medical advice about side effects  You may report side effects to FDA at 1-846-FDA-0068    © Copyright [employer] [facility] 2022 Information is for End User's use only and may not be sold, redistributed or otherwise used for commercial purposes  The above information is an  only  It is not intended as medical advice for individual conditions or treatments  Talk to your doctor, nurse or pharmacist before following any medical regimen to see if it is safe and effective for you  Buprenorphine/Naloxone (Into the mouth)   Buprenorphine (bue-pre-NOR-feen), Naloxone (nal-OX-one)  Treats narcotic dependence  Brand Name(s): Suboxone, Zubsolv   There may be other brand names for this medicine  When This Medicine Should Not Be Used: This medicine is not right for everyone  Do not use it if you had an allergic reaction to buprenorphine or naloxone  How to Use This Medicine: Thin Sheet, Tablet  Take your medicine as directed  Your dose may need to be changed several times to find what works best for you  You must let the medicine dissolve  Never swallow the film or tablet  Your body may not absorb enough of the medicine if you swallow it  Your health caregiver should show you how to use the medicine  If you do not understand, ask for help  It is important to use the medicine correctly  Do not talk while the medicine is inside your mouth  Buccal film: Rinse your mouth with water to moisten it  Place the film against the inside of your cheek  If your doctor told you to use more than 1 film, place the second film inside your other cheek   Do not place more than 2 films inside of 1 cheek at a time  Do not move or touch the film  Do not eat or drink anything until the film is completely dissolved  Sublingual tablet: Place the tablet under your tongue  If your doctor told you to use more than 1 tablet, place all of the tablets in different places under your tongue at the same time  You can use 2 tablets at a time until you have taken all of the medicine, if that is easier for you  Let the tablets dissolve completely in your mouth  Do not eat or drink anything until the tablets are completely dissolved  Sublingual film: Drink some water to help moisten your mouth  Place the film under your tongue  If your doctor told you to use more than 1 film, place the second film on the opposite side from the first one  Do not move the film after you placed it under your tongue  If you are supposed to use more than 2 films, use them the same way, but do not start until the first 2 films are completely dissolved  Do not eat or drink anything until the film is completely dissolved  Do not break, crush, chew, or cut the film or tablet  This medicine should come with a Medication Guide  Ask your pharmacist for a copy if you do not have one  Missed dose: Take a dose as soon as you remember  If it is almost time for your next dose, wait until then and take a regular dose  Do not take extra medicine to make up for a missed dose  Store the medicine in a closed container at room temperature, away from heat, moisture, and direct light  Drop off any unused narcotic medicine at a drug take-back location right away  If you do not have a drug take-back location near you, flush any unused narcotic medicine down the toilet  Check your local drug store and clinics for take-back locations  You can also check the [employer] web site for locations   Here is the link to the FDA safe disposal of medicines [employer] com ee  Drugs and Foods to Avoid:   Ask your doctor or pharmacist before using any other medicine, including over-the-counter medicines, vitamins, and herbal products  Do not use this medicine if you are using or have used an MAO inhibitor within the past 14 days  Some medicines can affect how buprenorphine/naloxone works  Tell your doctor if you are using the following:   Carbamazepine, cyclobenzaprine, erythromycin, ketoconazole, metaxalone, mirtazapine, phenobarbital, phenytoin, rifampin, tramadol, trazodone  Diuretic (water pill)  Medicine to treat depression or mental health problems (including SNRIs, SSRIs, TCAs)  Medicine to treat HIV/AIDS (including atazanavir, delavirdine, efavirenz, etravirine, nevirapine, ritonavir)  Phenothiazine medicine  Triptan medicine to treat migraine headaches  Do not drink alcohol while you are using this medicine  Tell your doctor if you use anything else that makes you sleepy  Some examples are allergy medicine, narcotic pain medicine, and alcohol  Tell your doctor if you are also using butorphanol, nalbuphine, pentazocine, or a muscle relaxer  Warnings While Using This Medicine:   Tell your doctor if you are pregnant or breastfeeding, or if you have kidney disease, liver disease (including hepatitis), lung or breathing problems (including sleep apnea), adrenal gland problems, an enlarged prostate, trouble urinating, gallbladder problems, thyroid problems, stomach problems, or a history of depression, brain tumor, head injury, or alcohol or drug abuse    This medicine may cause the following problems:  High risk of overdose, which can lead to death  Respiratory depression (serious breathing problem that can be life-threatening)  Adrenal gland problems  Sleep-related breathing problems (including sleep apnea, sleep-related hypoxemia)  Low blood pressure  Liver problems  Serotonin syndrome, when used with certain medicines  This medicine may make you dizzy or drowsy  Do not drive or do anything else that could be dangerous until you know how this medicine affects you  Sit or lie down if you feel dizzy  Stand up carefully  Tell any doctor or dentist who treats you that you are using this medicine  This medicine can be habit-forming  Do not use more than your prescribed dose  Call your doctor if you think your medicine is not working  This medicine may cause constipation, especially with long-term use  Ask your doctor if you should use a laxative to prevent and treat constipation  Do not stop using this medicine suddenly  Your doctor will need to slowly decrease your dose before you stop it completely  This medicine could cause infertility  Talk with your doctor before using this medicine if you plan to have children  Your doctor will do lab tests at regular visits to check on the effects of this medicine  Keep all appointments  Keep all medicine out of the reach of children  Never share your medicine with anyone  Possible Side Effects While Using This Medicine:   Call your doctor right away if you notice any of these side effects:   Allergic reaction: Itching or hives, swelling in your face or hands, swelling or tingling in your mouth or throat, chest tightness, trouble breathing  Blue lips, fingernails, or skin, trouble breathing  Changes in skin color, dark freckles, cold feeling, tiredness, weight loss  Dark urine or pale stools, nausea, vomiting, loss of appetite, stomach pain, yellow skin or eyes  Extreme dizziness, drowsiness, or weakness, slow heartbeat, sweating, seizures, cold or clammy skin  Severe confusion, lightheadedness, dizziness, or fainting  If you notice these less serious side effects, talk with your doctor:   Constipation, diarrhea, nausea, vomiting, stomach upset  Headache  Stuffy or runny nose  If you notice other side effects that you think are caused by this medicine, tell your doctor  Call your doctor for medical advice about side effects  You may report side effects to FDA at 0-587-GYG-3542    © Copyright [facility] 2022 Information is for End User's use only and may not be sold, redistributed or otherwise used for commercial purposes  The above information is an  only  It is not intended as medical advice for individual conditions or treatments  Talk to your doctor, nurse or pharmacist before following any medical regimen to see if it is safe and effective for you

## 2022-05-27 NOTE — ED PROVIDER NOTES
History  Chief Complaint   Patient presents with    Withdrawal - Drug     Physician gave him suboxone "too soon" and he is going through precipitated withdrawal per GF; sent here by [facility]MD office for evaluation     29-year-old male presents to the emergency department seeking evaluation for acute opiate withdrawal   Patient states that his physician started him on Suboxone which she believes may have precipitated his withdrawal symptoms  He presents with pyloric shin diaphoresis generalized body aches and pains as well as nausea and vomiting  He is accompanied by significant other  Patient reportedly last used heroin yesterday  Allergies reviewed          Prior to Admission Medications   Prescriptions Last Dose Informant Patient Reported? Taking? buprenorphine-naloxone (Suboxone) 8-2 mg 2022 at 1330  No Yes   Sig: One or two strips sl q day  citalopram (CeleXA) 20 mg tablet Not Taking at Unknown time  No No   Sig: Take 1 tablet (20 mg total) by mouth daily   Patient not taking: No sig reported   naloxone (Narcan) 4 mg/0 1 mL nasal spray   No No   Si 1 mL (4 mg total) into each nostril as needed (respiratory depression or possible OD)      Facility-Administered Medications: None       History reviewed  No pertinent past medical history  Past Surgical History:   Procedure Laterality Date    HAND TENDON SURGERY         Family History   Problem Relation Age of Onset    Substance Abuse Mother     Alcohol abuse Father     Cirrhosis Father     Cirrhosis Maternal Grandfather     Throat cancer Family      I have reviewed and agree with the history as documented      E-Cigarette/Vaping    E-Cigarette Use Never User      E-Cigarette/Vaping Substances    Nicotine No     THC No     CBD No     Flavoring No     Other No     Unknown No      Social History     Tobacco Use    Smoking status: Current Every Day Smoker     Packs/day: 1 00     Years: 20 00     Pack years: 20      Types: Cigarettes    Smokeless tobacco: Never Used   Vaping Use    Vaping Use: Never used   Substance Use Topics    Alcohol use: Not Currently     Comment: socially    Drug use: Yes     Types: Heroin     Comment: last use 1100 hours 5/26/2022       Review of Systems   Constitutional: Positive for chills, fatigue and fever  HENT: Negative for congestion, ear pain, rhinorrhea, sinus pressure, sneezing and sore throat  Eyes: Negative for pain and discharge  Respiratory: Negative for cough, choking, chest tightness, shortness of breath and wheezing  Cardiovascular: Negative for chest pain and palpitations  Gastrointestinal: Positive for abdominal pain, nausea and vomiting  Negative for constipation and diarrhea  Genitourinary: Negative for difficulty urinating and dysuria  Musculoskeletal: Positive for arthralgias and myalgias  Negative for back pain, gait problem, neck pain and neck stiffness  Neurological: Negative for dizziness, light-headedness and headaches  All other systems reviewed and are negative  Physical Exam  Physical Exam  Vitals and nursing note reviewed  Constitutional:       General: He is in acute distress  Appearance: He is well-developed and well-groomed  He is ill-appearing  HENT:      Head: Normocephalic and atraumatic  Right Ear: External ear normal       Left Ear: External ear normal       Nose: Nose normal    Eyes:      General: Lids are normal  Vision grossly intact  Gaze aligned appropriately  Extraocular Movements: Extraocular movements intact  Conjunctiva/sclera: Conjunctivae normal       Pupils: Pupils are equal, round, and reactive to light  Cardiovascular:      Rate and Rhythm: Normal rate and regular rhythm  Heart sounds: Normal heart sounds  No murmur heard  No friction rub  No gallop  Pulmonary:      Effort: Pulmonary effort is normal  No respiratory distress  Breath sounds: Normal breath sounds  No stridor  No wheezing or rales  Abdominal:      General: Bowel sounds are normal  There is no distension  Palpations: Abdomen is soft  Tenderness: There is no abdominal tenderness  There is no guarding  Musculoskeletal:         General: No tenderness  Normal range of motion  Cervical back: Normal range of motion and neck supple  Skin:     General: Skin is warm  Capillary Refill: Capillary refill takes less than 2 seconds  Neurological:      Mental Status: He is alert and oriented to person, place, and time  Psychiatric:         Behavior: Behavior is cooperative           Vital Signs  ED Triage Vitals [05/27/22 1439]   Temperature Pulse Respirations Blood Pressure SpO2   98 °F (36 7 °C) 98 (!) 24 139/78 97 %      Temp Source Heart Rate Source Patient Position - Orthostatic VS BP Location FiO2 (%)   Tympanic Monitor Sitting Left arm --      Pain Score       10 - Worst Possible Pain           Vitals:    05/27/22 1439 05/27/22 2059   BP: 139/78 140/75   Pulse: 98 72   Patient Position - Orthostatic VS: Sitting Lying         Visual Acuity      ED Medications  Medications   cloNIDine (CATAPRES) tablet 0 2 mg (0 2 mg Oral Given 5/27/22 1538)   ondansetron (ZOFRAN-ODT) dispersible tablet 4 mg (4 mg Oral Given 5/27/22 1551)   cloNIDine (CATAPRES) tablet 0 2 mg (0 2 mg Oral Given 5/27/22 1625)   sodium chloride 0 9 % bolus 1,000 mL (0 mL Intravenous Stopped 5/27/22 1930)   LORazepam (ATIVAN) injection 1 mg (1 mg Intravenous Given 5/27/22 1729)   buprenorphine-naloxone (Suboxone) film 16 mg (16 mg Sublingual Given 5/27/22 1932)   haloperidol lactate (HALDOL) injection 2 mg (2 mg Intravenous Given 5/27/22 2056)   LORazepam (ATIVAN) injection 2 mg (2 mg Intravenous Given 5/27/22 2144)       Diagnostic Studies  Results Reviewed     Procedure Component Value Units Date/Time    Basic metabolic panel [[id number]] Collected: 05/27/22 1541    Lab Status: Final result Specimen: Blood from Arm, Right Updated: 05/27/22 1613     Sodium 140 mmol/L      Potassium 3 6 mmol/L      Chloride 102 mmol/L      CO2 29 mmol/L      ANION GAP 9 mmol/L      BUN 16 mg/dL      Creatinine 1 29 mg/dL      Glucose 120 mg/dL      Calcium 9 7 mg/dL      eGFR 71 ml/min/1 73sq m     Narrative:      National Kidney Disease Foundation guidelines for Chronic Kidney Disease (CKD):     Stage 1 with normal or high GFR (GFR > 90 mL/min/1 73 square meters)    Stage 2 Mild CKD (GFR = 60-89 mL/min/1 73 square meters)    Stage 3A Moderate CKD (GFR = 45-59 mL/min/1 73 square meters)    Stage 3B Moderate CKD (GFR = 30-44 mL/min/1 73 square meters)    Stage 4 Severe CKD (GFR = 15-29 mL/min/1 73 square meters)    Stage 5 End Stage CKD (GFR <15 mL/min/1 73 square meters)  Note: GFR calculation is accurate only with a steady state creatinine    UA (URINE) with reflex to Scope [[id number]]  (Normal) Collected: 05/27/22 1541    Lab Status: Final result Specimen: Urine, Clean Catch Updated: 05/27/22 1601     Color, UA Yellow     Clarity, UA Clear     Specific Gravity, UA 1 015     pH, UA 7 5     Leukocytes, UA Negative     Nitrite, UA Negative     Protein, UA Negative mg/dl      Glucose, UA Negative mg/dl      Ketones, UA Negative mg/dl      Urobilinogen, UA 1 0 E U /dl      Bilirubin, UA Negative     Blood, UA Negative    CBC and differential [[id number]]  (Abnormal) Collected: 05/27/22 1541    Lab Status: Final result Specimen: Blood from Arm, Right Updated: 05/27/22 4246     WBC 10 62 Thousand/uL      RBC 5 04 Million/uL      Hemoglobin 14 9 g/dL      Hematocrit 45 6 %      MCV 91 fL      MCH 29 6 pg      MCHC 32 7 g/dL      RDW 12 7 %      MPV 9 2 fL      Platelets 411 Thousands/uL      nRBC 0 /100 WBCs      Neutrophils Relative 79 %      Immat GRANS % 0 %      Lymphocytes Relative 14 %      Monocytes Relative 6 %      Eosinophils Relative 1 %      Basophils Relative 0 %      Neutrophils Absolute 8 45 Thousands/µL      Immature Grans Absolute 0 03 Thousand/uL      Lymphocytes Absolute 1 46 Thousands/µL      Monocytes Absolute 0 59 Thousand/µL      Eosinophils Absolute 0 05 Thousand/µL      Basophils Absolute 0 04 Thousands/µL                  No orders to display              Procedures  Procedures         ED Course  ED Course as of 05/29/22 1545   Fri May 27, 2022   2020 Patient re-evaluated after getting 16 more mg of Suboxone  Patient still vomiting  Will give 8 mg more and 2 mg of IV Haldol per recommendations of toxicology   2208 Case signed out to Dr Anabel Werner pending possible transfer to detox  [facility]  Number of Diagnoses or Management Options  Diagnosis management comments: Patient care signed out to attending physician for further management and patient re-evaluation  Disposition  Final diagnoses:   Opiate withdrawal ([address] )     Time reflects when diagnosis was documented in both MDM as applicable and the Disposition within this note     Time User Action Codes Description Comment    5/27/2022  7:38 PM Adonis Melton Add [X13 64] Opiate withdrawal [facility])       ED Disposition     ED Disposition   AMA    Condition   --    Date/Time   Sat May 28, 2022  3:41 AM    Comment   Date: 5/28/2022  Patient: Adilene Carpenter  Admitted: 5/27/2022  2:42 PM  Attending Provider: Iliana Walker DO    Adilene Carpenter or his authorized caregiver has made the decision for the patient to leave the emergency department against  the advice of his attending physician  He or his authorized caregiver has been informed and understands the inherent risks, including death, paralysis, need for life support, depression, pain and sufferring, addiction, inability to live a normal or f ulfilling life, being completely dependent on the care of others  Dl Bhatia He or his authorized caregiver has decided to accept the responsibility for this decision  Adilene Carpenter and all necessary parties have been advised that he may return for [facility] er evaluation or treatment   His condition at time of discharge was gaurlamonte Naylor had current vital signs as follows:  /75 (BP Location: Left arm)   Pulse 72   Temp 98 °F (36 7 °C) (Tympanic)   Resp 18   Ht 6' 1" (1 854 m)    Wt 109 kg (240 lb)            MD Documentation    Flowsheet Row Most Recent Value   Patient Condition The patient has been stabilized such that within reasonable medical probability, no material deterioration of the patient condition or the condition of the unborn child(santiago) is likely to result from the transfer   Reason for Transfer Level of Care needed not available at this facility   Benefits of Transfer Specialized equipment and/or services available at the receiving facility (Include comment)________________________   Risks of Transfer Potential for delay in receiving treatment, Loss of IV, Potential deterioration of medical condition, Possible worsening of condition or death during transfer, Increased discomfort during transfer   Accepting Physician BRITNEY Robledo [facility] Name, [address]   Sending MD [facility]   Provider Certification General risk, such as traffic hazards, adverse weather conditions, rough terrain or turbulence, possible failure of equipment (including vehicle or aircraft), or consequences of actions of persons outside the control of the transport personnel, Unanticipated needs of medical equipment and personnel during transport, Risk of worsening condition, The possibility of a transport vehicle being unavailable      RN Documentation    Flowsheet Row Most [facility] Name, [address]      Follow-up Information     Follow up With Specialties Details Why Contact Arcenio Antonio PA-C Family Medicine Schedule an appointment as soon as possible for a visit   [address] [place] [address], [address] [phone]            Discharge Medication List as of 5/28/2022  3:47 AM      CONTINUE these medications which have NOT CHANGED    Details buprenorphine-naloxone (Suboxone) 8-2 mg One or two strips sl q day , Normal      citalopram (CeleXA) 20 mg tablet Take 1 tablet (20 mg total) by mouth daily, Starting Wed 12/23/2020, Normal      naloxone (Narcan) 4 mg/0 1 mL nasal spray 0 1 mL (4 mg total) into each nostril as needed (respiratory depression or possible OD), Starting Fri 5/27/2022, Normal             No discharge procedures on file      PDMP Review       Value Time User    PDMP Reviewed  Yes 5/27/2022 12:34 PM Ashley Romero DO          ED Provider  Electronically Signed by           Linnell Riedel, PA-C  05/28/22 0901       Oh Gibson DO  05/29/22 6130

## 2022-05-28 NOTE — EMTALA/ACUTE CARE TRANSFER
[address] [facility] [address]  Dept: [phone]      EMTALA TRANSFER CONSENT    NAME Caitie Amador                                         1985                              MRN [id number]    I have been informed of my rights regarding examination, treatment, and transfer   by Dr Ebony Pollock DO    Benefits:      Risks:        Consent for Transfer:  I acknowledge that my medical condition has been evaluated and explained to me by the emergency department physician or other qualified medical person and/or my attending physician, who has recommended that I be transferred to the service of    at    The above potential benefits of such transfer, the potential risks associated with such transfer, and the probable risks of not being transferred have been explained to me, and I fully understand them  The doctor has explained that, in my case, the benefits of transfer outweigh the risks  I agree to be transferred  I authorize the performance of emergency medical procedures and treatments upon me in both transit and upon arrival at the receiving facility  Additionally, I authorize the release of any and all medical records to the receiving facility and request they be transported with me, if possible  I understand that the safest mode of transportation during a medical emergency is an ambulance and that the Hospital advocates the use of this mode of transport  Risks of traveling to the receiving facility by car, including absence of medical control, life sustaining equipment, such as oxygen, and medical personnel has been explained to me and I fully understand them  (NINI CORRECT BOX BELOW)  [  ]  I consent to the stated transfer and to be transported by ambulance/helicopter  [  ]  I consent to the stated transfer, but refuse transportation by ambulance and accept full responsibility for my transportation by car    I understand the risks of non-ambulance transfers and I exonerate the Hospital and its staff from any deterioration in my condition that results from this refusal     X___________________________________________    DATE  22  TIME________  Signature of patient or legally responsible individual signing on patient behalf           RELATIONSHIP TO PATIENT_________________________          Provider Certification    NAME Adin Asif                                         1985                              MRN [id number]    A medical screening exam was performed on the above named patient  Based on the examination:    Condition Necessitating Transfer The encounter diagnosis was Opiate withdrawal ([facility] [address] )  Patient Condition:      Reason for Transfer:      Transfer Requirements: Facility     · Space available and qualified personnel available for treatment as acknowledged by    · Agreed to accept transfer and to provide appropriate medical treatment as acknowledged by          · Appropriate medical records of the examination and treatment of the patient are provided at the time of transfer   [address], [address] _______  · Transfer will be performed by qualified personnel from    and appropriate transfer equipment as required, including the use of necessary and appropriate life support measures      Provider Certification: I have examined the patient and explained the following risks and benefits of being transferred/refusing transfer to the patient/family:         Based on these reasonable risks and benefits to the patient and/or the unborn child(santiago), and based upon the information available at the time of the patients examination, I certify that the medical benefits reasonably to be expected from the provision of appropriate medical treatments at another medical facility outweigh the increasing risks, if any, to the individuals medical condition, and in the case of labor to the unborn child, from effecting the transfer      X____________________________________________ DATE 05/27/22        TIME_______      ORIGINAL - SEND TO MEDICAL RECORDS   COPY - SEND WITH PATIENT DURING TRANSFER

## 2022-05-28 NOTE — ED NOTES
Pt refused transfer; provider in to see pt; pt deciding to leave 1700 Augustin Rice, RN  05/28/22 6694

## 2022-05-28 NOTE — ED ATTENDING ATTESTATION
5/27/2022  IElvis, , saw and evaluated the patient  I have discussed the patient with the resident/non-physician practitioner and agree with the resident's/non-physician practitioner's findings, Plan of Care, and MDM as documented in the resident's/non-physician practitioner's note, except where noted  All available labs and Radiology studies were reviewed  I was present for key portions of any procedure(s) performed by the resident/non-physician practitioner and I was immediately available to provide assistance  At this point I agree with the current assessment done in the Emergency Department  I have conducted an independent evaluation of this patient a history and physical is as follows:    ED Course  ED Course as of 05/28/22 0341   Fri May 27, 2022   2202 Opiate withdraw, medicated, restless, piloerection, given extra suboxone  Given ativan  Possible transfer to detox  Re-eval at 1030, possible transfer to [address] [place] [address]  May require admission failure to tolerate PO  Sat May 28, 2022   9433 Patient was agreeable to transfer, however now that EMS is here he is refusing  He says he feels better  I explained all the risks  He has capacity to make medical decisions  Patient takes responsible for any and all risks leaving AMA from the hospital  Patient is free to make his own decisions and it would be illegal for me to hold patient against his will           Critical Care Time  Procedures

## 2022-05-28 NOTE — CONSULTS
INTERPROFESSIONAL (PHONE) [facility]  Trsiha Stephens 30 y o  male MRN: [id number]  Unit/Bed#: ED 27 Encounter: [id number]      Reason for Consult / Principal Problem: Precipitated opioid withdrawal  Inpatient consult to Toxicology  Consult performed by: Jeff Kim DO  Consult ordered by: Meron Davis DO        05/27/22      ASSESSMENT:  1  Precipitated Opioid withdrawal from buprenorphine  2  Opioid Use Disorder    RECOMMENDATIONS:  Precipitated opioid withdrawal from buprenorphine can be severe, occasionally requiring aggressive sedation with dexmedetomidine  Since it has been about 5 hours since his dose and he hadn't felipe developed severe agitation, I'm optimistic he won't at this point  Since he has already received 8mg, the best option at this point is to administer another 16mg buprenorphine  Watch for an hour or so afterwards  If he is still symptomatic, can give an another 8mg  Maximize comfort meds  Clonidine, zofran, ativan, immodium are all acceptable  No role for clonidine patch acutely  Would remove that  If patient not responding to zofran, 2mg haloperidol IV can be given for vomiting if QTc is wnl  If, after 3-4 hours, no progress has been made in symptom control, reach out the the detox AP for potential admission  For further questions, please contact the medical  on call via [place] [employer] or throughl the [employer]  Service or Patient [facility]  Please see additional teaching note below:    Hx and PE limited by the dynamics of a phone consultation  I have not personally interviewed or evaluated the patient, but only advised based on the information provided to me  Primary provider is responsible for all clinical decisions       Pertinent history, physical exam and clinical findings and course discussed: Trisha Stephens is a 30y o  year old male who presents with precipitated opioid withdrawal  The patient received a dose of 8mg buprenorphine at Dr Aurelio Han office this afternoon around 1:30 for bup initiation purposes  The reported last use documented as 11 o'clock, It is unclear if that was 11 AM or PM yesterday  The patient reportedly was using 3-4 bags per day of heroin via insufflation daily  Of course, in this geographic location, all "heroin" is essentiallhy fentanyl or fentanyl derivatives  Shortly after the dose, he developed diaphoresis and worsening nausea  He was sent to the ED for additional care  In the ED, he was given clonidine, ativan, zofran  Med tox was contacted for additional recs  Review of systems and physical exam not performed by me  Historical Information   History reviewed  No pertinent past medical history  Past Surgical History:   Procedure Laterality Date    HAND TENDON SURGERY       Social History   Social History     Substance and Sexual Activity   Alcohol Use Not Currently    Comment: socially     Social History     Substance and Sexual Activity   Drug Use Yes    Types: Heroin    Comment: last use 1100 hours 5/26/2022     Social History     Tobacco Use   Smoking Status Current Every Day Smoker    Packs/day: 1 00    Years: 20 00    Pack years: 20 00    Types: Cigarettes   Smokeless Tobacco Never Used     Family History   Problem Relation Age of Onset    Substance Abuse Mother     Alcohol abuse Father     Cirrhosis Father     Cirrhosis Maternal Grandfather     Throat cancer Family         Prior to Admission medications    Medication Sig Start Date End Date Taking? Authorizing Provider   buprenorphine-naloxone (Suboxone) 8-2 mg One or two strips sl q day   5/27/22  Yes Nathalia Ma,    citalopram (CeleXA) 20 mg tablet Take 1 tablet (20 mg total) by mouth daily  Patient not taking: No sig reported 12/23/20   Megan Lewis PA-C   naloxone (Narcan) 4 mg/0 1 mL nasal spray 0 1 mL (4 mg total) into each nostril as needed (respiratory depression or possible OD) 5/27/22   Jt Suarez DO Devyn   buprenorphine-naloxone (Suboxone) 8-2 mg Return to the office with the med for dosing  5/27/22 5/27/22  Jacquelene Brittle, DO       Current Facility-Administered Medications   Medication Dose Route Frequency    cloNIDine (CATAPRES-TTS-2) 0 2 mg/24 hr TD weekly patch  0 2 mg Transdermal Weekly    ondansetron (ZOFRAN) injection 4 mg  4 mg Intravenous Once       Allergies   Allergen Reactions    Morphine GI Intolerance       Objective       Intake/Output Summary (Last 24 hours) at 5/27/2022 2018  Last data filed at 5/27/2022 1930  Gross per 24 hour   Intake 1000 ml   Output --   Net 1000 ml       Invasive Devices:   Peripheral IV 05/27/22 Right Antecubital (Active)       Vitals   Vitals:    05/27/22 1439   BP: 139/78   TempSrc: Tympanic   Pulse: 98   Resp: (!) 24   Patient Position - Orthostatic VS: Sitting   Temp: 98 °F (36 7 °C)         EKG, Pathology, and/or Other Studies: I have personally reviewed pertinent reports  Lab Results: I have personally reviewed pertinent reports  Labs:    Results from last 7 days   Lab Units 05/27/22  1541   WBC Thousand/uL 10 62*   HEMOGLOBIN g/dL 14 9   HEMATOCRIT % 45 6   PLATELETS Thousands/uL 245   NEUTROS PCT % 79*   LYMPHS PCT % 14   MONOS PCT % 6      Results from last 7 days   Lab Units 05/27/22  1541   SODIUM mmol/L 140   POTASSIUM mmol/L 3 6   CHLORIDE mmol/L 102   CO2 mmol/L 29   BUN mg/dL 16   CREATININE mg/dL 1 29   CALCIUM mg/dL 9 7              No results found for: TROPONINI          Invalid input(s): EXTPREGUR      Imaging Studies: I have personally reviewed pertinent reports  Counseling / Coordination of Care  Total time spent today 28 minutes  This was a phone consultation

## 2022-05-28 NOTE — ED ATTENDING ATTESTATION
5/27/2022  Moira [address] DO, saw and evaluated the patient  I have discussed the patient with the resident/non-physician practitioner and agree with the resident's/non-physician practitioner's findings, Plan of Care, and MDM as documented in the resident's/non-physician practitioner's note, except where noted  All available labs and Radiology studies were reviewed  I was present for key portions of any procedure(s) performed by the resident/non-physician practitioner and I was immediately available to provide assistance  At this point I agree with the current assessment done in the Emergency Department  I have conducted an independent evaluation of this patient a history and physical is as follows:    Patient is a 61-year-old male with history of opiate abuse and notes that he last used 4 bags of heroin on May 26th  The patient has been in withdrawal for 24 hours and states that he went to see Dr Tez Gonzalez, who gave him Suboxone 8 mg strips in his office which sent the patient into precipitated withdrawal   The patient was vomiting having yawning and pyloric shin and abdominal cramps  The patient also is having restlessness  The patient called the physician back and he was referred to the ER  Patient given multiple medications while in the ED including clonidine, Zofran benzodiazepines as well as more Suboxone  Patient is still feeling similarly and any extra dose of Suboxone given under the recommendations of toxicology a been met with more vomiting and worse restlessness  Patient was also given 2 mg of IV Haldol  Case was discussed with Dr Vicky Ibrahim in toxicology who notes that the patient is doing better consider transfer to inpatient detox  Patient's heart is regular rate rhythm right now at 74 beats per minute  Lungs are coarse abdomen is soft and nontender  The patient is restless and complaining of abdominal cramping        ED Course  ED Course as of 05/27/22 2145   Fri May 27, 2022 2020 Patient re-evaluated after getting 16 more mg of Suboxone  Patient still vomiting    Will give 8 mg more and 2 mg of IV Haldol per recommendations of toxicology         Critical Care Time  Procedures

## 2022-05-28 NOTE — ED NOTES
Pt continuously removing cardiac monitoring and is non compliant     Jose Antonio Weeks, ARMIDA  05/28/22 1245

## 2022-05-30 ENCOUNTER — APPOINTMENT (EMERGENCY)
Dept: CT IMAGING | Facility: HOSPITAL | Age: 37
DRG: 208 | End: 2022-05-30
Payer: COMMERCIAL

## 2022-05-30 ENCOUNTER — APPOINTMENT (EMERGENCY)
Dept: RADIOLOGY | Facility: HOSPITAL | Age: 37
DRG: 208 | End: 2022-05-30
Payer: COMMERCIAL

## 2022-05-30 ENCOUNTER — HOSPITAL ENCOUNTER (INPATIENT)
Facility: HOSPITAL | Age: 37
LOS: 1 days | Discharge: HOME | DRG: 208 | End: 2022-05-31
Attending: EMERGENCY MEDICINE | Admitting: INTERNAL MEDICINE
Payer: COMMERCIAL

## 2022-05-30 DIAGNOSIS — R41.82 ALTERED MENTAL STATUS: Primary | ICD-10-CM

## 2022-05-30 DIAGNOSIS — G93.40 ENCEPHALOPATHY: ICD-10-CM

## 2022-05-30 DIAGNOSIS — E83.42 HYPOMAGNESEMIA: ICD-10-CM

## 2022-05-30 DIAGNOSIS — S09.90XA CLOSED HEAD INJURY, INITIAL ENCOUNTER: ICD-10-CM

## 2022-05-30 PROBLEM — J96.01 ACUTE RESPIRATORY FAILURE WITH HYPOXIA (HCC): Status: ACTIVE | Noted: 2022-05-30

## 2022-05-30 PROBLEM — G92.8 TOXIC METABOLIC ENCEPHALOPATHY: Status: ACTIVE | Noted: 2022-05-30

## 2022-05-30 PROBLEM — D72.829 LEUKOCYTOSIS: Status: ACTIVE | Noted: 2022-05-30

## 2022-05-30 PROBLEM — F19.10 SUBSTANCE ABUSE (HCC): Status: ACTIVE | Noted: 2022-05-30

## 2022-05-30 PROBLEM — E72.20 HYPERAMMONEMIA (HCC): Status: ACTIVE | Noted: 2022-05-30

## 2022-05-30 LAB
2HR DELTA HS TROPONIN: 0 NG/L
4HR DELTA HS TROPONIN: 0 NG/L
ABO GROUP BLD: NORMAL
ALBUMIN SERPL BCP-MCNC: 3.9 G/DL (ref 3.5–5)
ALP SERPL-CCNC: 76 U/L (ref 34–104)
ALT SERPL W P-5'-P-CCNC: 46 U/L (ref 7–52)
AMMONIA PLAS-SCNC: 86 UMOL/L (ref 18–72)
AMPHETAMINES SERPL QL SCN: NEGATIVE
ANION GAP SERPL CALCULATED.3IONS-SCNC: 12 MMOL/L (ref 4–13)
APAP SERPL-MCNC: <10 UG/ML (ref 10–20)
AST SERPL W P-5'-P-CCNC: 25 U/L (ref 13–39)
ATRIAL RATE: 72 BPM
BARBITURATES UR QL: NEGATIVE
BASE EX.OXY STD BLDV CALC-SCNC: 96.7 % (ref 60–80)
BASE EXCESS BLDA CALC-SCNC: -2.3 MMOL/L
BASE EXCESS BLDV CALC-SCNC: -0.8 MMOL/L
BASOPHILS # BLD AUTO: 0.05 THOUSANDS/ΜL (ref 0–0.1)
BASOPHILS NFR BLD AUTO: 1 % (ref 0–1)
BENZODIAZ UR QL: POSITIVE
BILIRUB SERPL-MCNC: 0.28 MG/DL (ref 0.2–1)
BILIRUB UR QL STRIP: NEGATIVE
BLD GP AB SCN SERPL QL: NEGATIVE
BUN SERPL-MCNC: 20 MG/DL (ref 5–25)
CALCIUM SERPL-MCNC: 9.4 MG/DL (ref 8.4–10.2)
CARDIAC TROPONIN I PNL SERPL HS: 3 NG/L
CHLORIDE SERPL-SCNC: 105 MMOL/L (ref 96–108)
CK MB SERPL-MCNC: 1 % (ref 0–2.5)
CK MB SERPL-MCNC: 2.4 NG/ML (ref 0.6–6.3)
CK SERPL-CCNC: 231 U/L (ref 39–308)
CLARITY UR: CLEAR
CO2 SERPL-SCNC: 22 MMOL/L (ref 21–32)
COCAINE UR QL: NEGATIVE
COLOR UR: YELLOW
CREAT SERPL-MCNC: 1.24 MG/DL (ref 0.6–1.3)
EOSINOPHIL # BLD AUTO: 0.08 THOUSAND/ΜL (ref 0–0.61)
EOSINOPHIL NFR BLD AUTO: 1 % (ref 0–6)
ERYTHROCYTE [DISTWIDTH] IN BLOOD BY AUTOMATED COUNT: 12.8 % (ref 11.6–15.1)
ETHANOL SERPL-MCNC: <10 MG/DL
GFR SERPL CREATININE-BSD FRML MDRD: 74 ML/MIN/1.73SQ M
GLUCOSE SERPL-MCNC: 116 MG/DL (ref 65–140)
GLUCOSE UR STRIP-MCNC: NEGATIVE MG/DL
HCO3 BLDA-SCNC: 21.7 MMOL/L (ref 22–28)
HCO3 BLDV-SCNC: 23.3 MMOL/L (ref 24–30)
HCT VFR BLD AUTO: 42.8 % (ref 36.5–49.3)
HGB BLD-MCNC: 14.1 G/DL (ref 12–17)
HGB UR QL STRIP.AUTO: NEGATIVE
IMM GRANULOCYTES # BLD AUTO: 0.04 THOUSAND/UL (ref 0–0.2)
IMM GRANULOCYTES NFR BLD AUTO: 0 % (ref 0–2)
KETONES UR STRIP-MCNC: NEGATIVE MG/DL
LACTATE SERPL-SCNC: 1.2 MMOL/L (ref 0.5–2)
LEUKOCYTE ESTERASE UR QL STRIP: NEGATIVE
LIPASE SERPL-CCNC: 18 U/L (ref 11–82)
LYMPHOCYTES # BLD AUTO: 1.8 THOUSANDS/ΜL (ref 0.6–4.47)
LYMPHOCYTES NFR BLD AUTO: 17 % (ref 14–44)
MAGNESIUM SERPL-MCNC: 1.8 MG/DL (ref 1.9–2.7)
MCH RBC QN AUTO: 29.9 PG (ref 26.8–34.3)
MCHC RBC AUTO-ENTMCNC: 32.9 G/DL (ref 31.4–37.4)
MCV RBC AUTO: 91 FL (ref 82–98)
METHADONE UR QL: NEGATIVE
MONOCYTES # BLD AUTO: 0.82 THOUSAND/ΜL (ref 0.17–1.22)
MONOCYTES NFR BLD AUTO: 8 % (ref 4–12)
NEUTROPHILS # BLD AUTO: 8.01 THOUSANDS/ΜL (ref 1.85–7.62)
NEUTS SEG NFR BLD AUTO: 73 % (ref 43–75)
NITRITE UR QL STRIP: NEGATIVE
NRBC BLD AUTO-RTO: 0 /100 WBCS
O2 CT BLDA-SCNC: 22.5 ML/DL (ref 16–23)
O2 CT BLDV-SCNC: 21.5 ML/DL
OPIATES UR QL SCN: NEGATIVE
OXYCODONE+OXYMORPHONE UR QL SCN: NEGATIVE
OXYHGB MFR BLDA: 98.2 % (ref 94–97)
P AXIS: 44 DEGREES
PCO2 BLDA: 35.4 MM HG (ref 36–44)
PCO2 BLDV: 36.9 MM HG (ref 42–50)
PCP UR QL: NEGATIVE
PH BLDA: 7.41 [PH] (ref 7.35–7.45)
PH BLDV: 7.42 [PH] (ref 7.3–7.4)
PH UR STRIP.AUTO: 6 [PH]
PLATELET # BLD AUTO: 235 THOUSANDS/UL (ref 149–390)
PMV BLD AUTO: 9.2 FL (ref 8.9–12.7)
PO2 BLDA: 397.4 MM HG (ref 75–129)
PO2 BLDV: 115.2 MM HG (ref 35–45)
POTASSIUM SERPL-SCNC: 3.5 MMOL/L (ref 3.5–5.3)
PR INTERVAL: 176 MS
PROT SERPL-MCNC: 6.4 G/DL (ref 6.4–8.4)
PROT UR STRIP-MCNC: NEGATIVE MG/DL
QRS AXIS: 7 DEGREES
QRSD INTERVAL: 104 MS
QT INTERVAL: 398 MS
QTC INTERVAL: 435 MS
RBC # BLD AUTO: 4.72 MILLION/UL (ref 3.88–5.62)
RH BLD: POSITIVE
SALICYLATES SERPL-MCNC: <5 MG/DL (ref 3–20)
SODIUM SERPL-SCNC: 139 MMOL/L (ref 135–147)
SP GR UR STRIP.AUTO: 1.02 (ref 1–1.03)
SPECIMEN EXPIRATION DATE: NORMAL
T WAVE AXIS: 19 DEGREES
THC UR QL: NEGATIVE
UROBILINOGEN UR QL STRIP.AUTO: 1 E.U./DL
VENTRICULAR RATE: 72 BPM
WBC # BLD AUTO: 10.8 THOUSAND/UL (ref 4.31–10.16)

## 2022-05-30 PROCEDURE — 93005 ELECTROCARDIOGRAM TRACING: CPT

## 2022-05-30 PROCEDURE — 94664 DEMO&/EVAL PT USE INHALER: CPT

## 2022-05-30 PROCEDURE — 83605 ASSAY OF LACTIC ACID: CPT | Performed by: EMERGENCY MEDICINE

## 2022-05-30 PROCEDURE — 82805 BLOOD GASES W/O2 SATURATION: CPT | Performed by: NURSE PRACTITIONER

## 2022-05-30 PROCEDURE — 94760 N-INVAS EAR/PLS OXIMETRY 1: CPT

## 2022-05-30 PROCEDURE — 70450 CT HEAD/BRAIN W/O DYE: CPT

## 2022-05-30 PROCEDURE — 82140 ASSAY OF AMMONIA: CPT | Performed by: EMERGENCY MEDICINE

## 2022-05-30 PROCEDURE — 99292 CRITICAL CARE ADDL 30 MIN: CPT | Performed by: ANESTHESIOLOGY

## 2022-05-30 PROCEDURE — 72170 X-RAY EXAM OF PELVIS: CPT

## 2022-05-30 PROCEDURE — 80143 DRUG ASSAY ACETAMINOPHEN: CPT | Performed by: EMERGENCY MEDICINE

## 2022-05-30 PROCEDURE — 99291 CRITICAL CARE FIRST HOUR: CPT | Performed by: NURSE PRACTITIONER

## 2022-05-30 PROCEDURE — 82550 ASSAY OF CK (CPK): CPT | Performed by: EMERGENCY MEDICINE

## 2022-05-30 PROCEDURE — 80179 DRUG ASSAY SALICYLATE: CPT | Performed by: EMERGENCY MEDICINE

## 2022-05-30 PROCEDURE — 72125 CT NECK SPINE W/O DYE: CPT

## 2022-05-30 PROCEDURE — 86850 RBC ANTIBODY SCREEN: CPT | Performed by: EMERGENCY MEDICINE

## 2022-05-30 PROCEDURE — 94002 VENT MGMT INPAT INIT DAY: CPT

## 2022-05-30 PROCEDURE — G1004 CDSM NDSC: HCPCS

## 2022-05-30 PROCEDURE — 36415 COLL VENOUS BLD VENIPUNCTURE: CPT | Performed by: EMERGENCY MEDICINE

## 2022-05-30 PROCEDURE — 99285 EMERGENCY DEPT VISIT HI MDM: CPT

## 2022-05-30 PROCEDURE — 80053 COMPREHEN METABOLIC PANEL: CPT | Performed by: EMERGENCY MEDICINE

## 2022-05-30 PROCEDURE — 86901 BLOOD TYPING SEROLOGIC RH(D): CPT | Performed by: EMERGENCY MEDICINE

## 2022-05-30 PROCEDURE — 84484 ASSAY OF TROPONIN QUANT: CPT | Performed by: EMERGENCY MEDICINE

## 2022-05-30 PROCEDURE — 86900 BLOOD TYPING SEROLOGIC ABO: CPT | Performed by: EMERGENCY MEDICINE

## 2022-05-30 PROCEDURE — 83735 ASSAY OF MAGNESIUM: CPT | Performed by: EMERGENCY MEDICINE

## 2022-05-30 PROCEDURE — 93010 ELECTROCARDIOGRAM REPORT: CPT | Performed by: INTERNAL MEDICINE

## 2022-05-30 PROCEDURE — 31500 INSERT EMERGENCY AIRWAY: CPT | Performed by: EMERGENCY MEDICINE

## 2022-05-30 PROCEDURE — 83690 ASSAY OF LIPASE: CPT | Performed by: EMERGENCY MEDICINE

## 2022-05-30 PROCEDURE — 85025 COMPLETE CBC W/AUTO DIFF WBC: CPT | Performed by: EMERGENCY MEDICINE

## 2022-05-30 PROCEDURE — 80307 DRUG TEST PRSMV CHEM ANLYZR: CPT | Performed by: NURSE PRACTITIONER

## 2022-05-30 PROCEDURE — 82077 ASSAY SPEC XCP UR&BREATH IA: CPT | Performed by: EMERGENCY MEDICINE

## 2022-05-30 PROCEDURE — 36600 WITHDRAWAL OF ARTERIAL BLOOD: CPT

## 2022-05-30 PROCEDURE — 99291 CRITICAL CARE FIRST HOUR: CPT | Performed by: EMERGENCY MEDICINE

## 2022-05-30 PROCEDURE — 82805 BLOOD GASES W/O2 SATURATION: CPT | Performed by: EMERGENCY MEDICINE

## 2022-05-30 PROCEDURE — 84484 ASSAY OF TROPONIN QUANT: CPT | Performed by: NURSE PRACTITIONER

## 2022-05-30 PROCEDURE — 0BH17EZ INSERTION OF ENDOTRACHEAL AIRWAY INTO TRACHEA, VIA NATURAL OR ARTIFICIAL OPENING: ICD-10-PCS | Performed by: EMERGENCY MEDICINE

## 2022-05-30 PROCEDURE — 96365 THER/PROPH/DIAG IV INF INIT: CPT

## 2022-05-30 PROCEDURE — 71250 CT THORAX DX C-: CPT

## 2022-05-30 PROCEDURE — 71045 X-RAY EXAM CHEST 1 VIEW: CPT

## 2022-05-30 PROCEDURE — 82553 CREATINE MB FRACTION: CPT | Performed by: EMERGENCY MEDICINE

## 2022-05-30 PROCEDURE — 5A1935Z RESPIRATORY VENTILATION, LESS THAN 24 CONSECUTIVE HOURS: ICD-10-PCS | Performed by: EMERGENCY MEDICINE

## 2022-05-30 PROCEDURE — 81003 URINALYSIS AUTO W/O SCOPE: CPT | Performed by: EMERGENCY MEDICINE

## 2022-05-30 PROCEDURE — 74176 CT ABD & PELVIS W/O CONTRAST: CPT

## 2022-05-30 RX ORDER — ETOMIDATE 2 MG/ML
20 INJECTION INTRAVENOUS ONCE
Status: COMPLETED | OUTPATIENT
Start: 2022-05-30 | End: 2022-05-30

## 2022-05-30 RX ORDER — ACETAMINOPHEN 325 MG/1
650 TABLET ORAL EVERY 4 HOURS PRN
Status: DISCONTINUED | OUTPATIENT
Start: 2022-05-30 | End: 2022-05-31 | Stop reason: HOSPADM

## 2022-05-30 RX ORDER — NICOTINE 21 MG/24HR
21 PATCH, TRANSDERMAL 24 HOURS TRANSDERMAL DAILY
Status: DISCONTINUED | OUTPATIENT
Start: 2022-05-30 | End: 2022-05-31 | Stop reason: HOSPADM

## 2022-05-30 RX ORDER — FAMOTIDINE 20 MG/1
20 TABLET, FILM COATED ORAL DAILY
Status: DISCONTINUED | OUTPATIENT
Start: 2022-05-30 | End: 2022-05-31 | Stop reason: HOSPADM

## 2022-05-30 RX ORDER — KETAMINE HYDROCHLORIDE 50 MG/ML
INJECTION, SOLUTION, CONCENTRATE INTRAMUSCULAR; INTRAVENOUS
Status: COMPLETED
Start: 2022-05-30 | End: 2022-05-30

## 2022-05-30 RX ORDER — FENTANYL CITRATE-0.9 % NACL/PF 10 MCG/ML
100 PLASTIC BAG, INJECTION (ML) INTRAVENOUS CONTINUOUS
Status: DISCONTINUED | OUTPATIENT
Start: 2022-05-30 | End: 2022-05-30

## 2022-05-30 RX ORDER — ENOXAPARIN SODIUM 100 MG/ML
40 INJECTION SUBCUTANEOUS DAILY
Status: DISCONTINUED | OUTPATIENT
Start: 2022-05-30 | End: 2022-05-31 | Stop reason: HOSPADM

## 2022-05-30 RX ORDER — SUCCINYLCHOLINE/SOD CL,ISO/PF 100 MG/5ML
125 SYRINGE (ML) INTRAVENOUS ONCE
Status: COMPLETED | OUTPATIENT
Start: 2022-05-30 | End: 2022-05-30

## 2022-05-30 RX ORDER — KETOROLAC TROMETHAMINE 30 MG/ML
15 INJECTION, SOLUTION INTRAMUSCULAR; INTRAVENOUS ONCE
Status: COMPLETED | OUTPATIENT
Start: 2022-05-30 | End: 2022-05-30

## 2022-05-30 RX ORDER — CALCIUM CARBONATE 200(500)MG
1000 TABLET,CHEWABLE ORAL DAILY PRN
Status: DISCONTINUED | OUTPATIENT
Start: 2022-05-30 | End: 2022-05-30

## 2022-05-30 RX ORDER — DEXMEDETOMIDINE HYDROCHLORIDE 4 UG/ML
.1-.7 INJECTION, SOLUTION INTRAVENOUS
Status: DISCONTINUED | OUTPATIENT
Start: 2022-05-30 | End: 2022-05-30

## 2022-05-30 RX ORDER — CHLORHEXIDINE GLUCONATE 0.12 MG/ML
15 RINSE ORAL EVERY 12 HOURS SCHEDULED
Status: DISCONTINUED | OUTPATIENT
Start: 2022-05-30 | End: 2022-05-30

## 2022-05-30 RX ORDER — SODIUM CHLORIDE, SODIUM GLUCONATE, SODIUM ACETATE, POTASSIUM CHLORIDE, MAGNESIUM CHLORIDE, SODIUM PHOSPHATE, DIBASIC, AND POTASSIUM PHOSPHATE .53; .5; .37; .037; .03; .012; .00082 G/100ML; G/100ML; G/100ML; G/100ML; G/100ML; G/100ML; G/100ML
100 INJECTION, SOLUTION INTRAVENOUS CONTINUOUS
Status: DISCONTINUED | OUTPATIENT
Start: 2022-05-30 | End: 2022-05-30

## 2022-05-30 RX ORDER — POTASSIUM CHLORIDE 20MEQ/15ML
40 LIQUID (ML) ORAL ONCE
Status: COMPLETED | OUTPATIENT
Start: 2022-05-30 | End: 2022-05-30

## 2022-05-30 RX ORDER — MIDAZOLAM HYDROCHLORIDE 1 MG/ML
1 INJECTION INTRAMUSCULAR; INTRAVENOUS ONCE
Status: COMPLETED | OUTPATIENT
Start: 2022-05-30 | End: 2022-05-30

## 2022-05-30 RX ORDER — NICOTINE 21 MG/24HR
21 PATCH, TRANSDERMAL 24 HOURS TRANSDERMAL DAILY
Status: DISCONTINUED | OUTPATIENT
Start: 2022-05-30 | End: 2022-05-30

## 2022-05-30 RX ORDER — MAGNESIUM SULFATE HEPTAHYDRATE 40 MG/ML
2 INJECTION, SOLUTION INTRAVENOUS ONCE
Status: COMPLETED | OUTPATIENT
Start: 2022-05-30 | End: 2022-05-30

## 2022-05-30 RX ORDER — VECURONIUM BROMIDE 1 MG/ML
10 INJECTION, POWDER, LYOPHILIZED, FOR SOLUTION INTRAVENOUS ONCE
Status: DISCONTINUED | OUTPATIENT
Start: 2022-05-30 | End: 2022-05-30

## 2022-05-30 RX ORDER — PROPOFOL 10 MG/ML
5-50 INJECTION, EMULSION INTRAVENOUS
Status: DISCONTINUED | OUTPATIENT
Start: 2022-05-30 | End: 2022-05-30

## 2022-05-30 RX ADMIN — Medication 125 MG: at 05:06

## 2022-05-30 RX ADMIN — KETAMINE HYDROCHLORIDE 200 MG: 50 INJECTION INTRAMUSCULAR; INTRAVENOUS at 05:21

## 2022-05-30 RX ADMIN — MAGNESIUM SULFATE HEPTAHYDRATE 2 G: 40 INJECTION, SOLUTION INTRAVENOUS at 08:50

## 2022-05-30 RX ADMIN — NICOTINE 21 MG: 21 PATCH, EXTENDED RELEASE TRANSDERMAL at 22:01

## 2022-05-30 RX ADMIN — ETOMIDATE 20 MG: 2 INJECTION, SOLUTION INTRAVENOUS at 05:06

## 2022-05-30 RX ADMIN — ACETAMINOPHEN 650 MG: 325 TABLET ORAL at 18:01

## 2022-05-30 RX ADMIN — POTASSIUM CHLORIDE 40 MEQ: 20 SOLUTION ORAL at 09:00

## 2022-05-30 RX ADMIN — PROPOFOL 8 MCG/KG/MIN: 10 INJECTION, EMULSION INTRAVENOUS at 05:09

## 2022-05-30 RX ADMIN — DEXMEDETOMIDINE HYDROCHLORIDE 0.5 MCG/KG/HR: 400 INJECTION INTRAVENOUS at 09:00

## 2022-05-30 RX ADMIN — KETOROLAC TROMETHAMINE 15 MG: 30 INJECTION, SOLUTION INTRAMUSCULAR at 21:04

## 2022-05-30 RX ADMIN — DEXMEDETOMIDINE HYDROCHLORIDE 0.2 MCG/KG/HR: 400 INJECTION INTRAVENOUS at 07:50

## 2022-05-30 RX ADMIN — ENOXAPARIN SODIUM 40 MG: 100 INJECTION SUBCUTANEOUS at 08:50

## 2022-05-30 RX ADMIN — FAMOTIDINE 20 MG: 20 TABLET ORAL at 15:19

## 2022-05-30 RX ADMIN — NICOTINE 21 MG: 21 PATCH, EXTENDED RELEASE TRANSDERMAL at 13:42

## 2022-05-30 NOTE — ASSESSMENT & PLAN NOTE
· Hx heroin abuse, most recently receiving suboxone, unclear if complaint/taking appropriately  · Once extubated, will offer transfer to detox unit

## 2022-05-30 NOTE — H&P
[address]  H&P- Heidi Santos 1985, 30 y o  male MRN: [id number]  Unit/Bed#: ICU 08-01 Encounter: [id number]  Primary Care Provider: Adelaide Bolton PA-C   Date and time admitted to hospital: 5/30/2022  4:57 AM    * Acute respiratory failure with hypoxia [facility])  Assessment & Plan  · Intubated for airway protection given toxic metabolic encephalopathy and agitation  · Continue ACVC 16/500/40/6, titrate FiO2 for SpO2>90  · Follow up on initial lab and imaging workup, if unremarkable will plan to wean to extubate  · Continue propofol and fentanyl, titrate for RASS 0, will add precedex will plans to wean to extubate  · Daily SAT/SBT    Toxic metabolic encephalopathy  Assessment & Plan  · Unclear etiology, possibly related to narcotic withdrawal vs substance abuse  · Coma panel unremarkable  · Will send UDS   · Start precedex infusion for agitation/anxiety, attempt to wean propofol and fentanyl to obtain neuro exam  · CT head unremarkable   · Regulate sleep/wake  · Delirium precuations    Hyperammonemia (HCC)  Assessment & Plan  · Unclear etiology, CT shows fatty liver, LFTs unremarkable  · Consider lactulose if ongoing encephalopathy     Leukocytosis  Assessment & Plan  · Likely reactive, do not suspect any current infection  · UA, CXR, and CT AP unremarkable  · Monitor off abx    Substance abuse ([facility] [address] )  Assessment & Plan  · Hx heroin abuse, most recently receiving suboxone, unclear if complaint/taking appropriately  · Once extubated, will offer transfer to detox unit       -------------------------------------------------------------------------------------------------------------  Chief Complaint: altered mental status, agitation     History of Present Illness   HX and PE limited by: intubated   Heidi Santos is a 30 y o  male who presents with agitation and AMS  He has PMH substance abuse with heroin, recently started suboxone therapy   He was recently reffered to IP detox on 5/27 however refused and left the ED AMA  He returns this AM after s/o other called EMS as the patient was agitated, encephalopathic, and falling with head strike  He received versed and ketamine en route  In the ED trauma alert was called, and the patient was intubated for airway protection in order to undergo workup  Trauma workup was unrevealing  Labs unremarkable  He will be admitted to ICU for further workup and management  History obtained from chart review  -------------------------------------------------------------------------------------------------------------  Dispo: Admit to Critical Care     Code Status: Level 1 - Full Code  --------------------------------------------------------------------------------------------------------------  Review of Systems    Review of systems was unable to be performed secondary to intubation    Physical Exam  Constitutional:       General: He is not in acute distress  Interventions: He is sedated and intubated  HENT:      Head: Normocephalic and atraumatic  Mouth/Throat:      Mouth: Mucous membranes are moist    Eyes:      Pupils: Pupils are equal, round, and reactive to light  Cardiovascular:      Rate and Rhythm: Normal rate and regular rhythm  Pulses: Normal pulses  Heart sounds: Normal heart sounds  No murmur heard  No friction rub  No gallop  Pulmonary:      Effort: Pulmonary effort is normal  He is intubated  Breath sounds: Normal breath sounds  Abdominal:      General: Bowel sounds are normal  There is no distension  Palpations: Abdomen is soft  Tenderness: There is no abdominal tenderness  Musculoskeletal:         General: No swelling  Cervical back: Neck supple  Skin:     General: Skin is warm and dry  Capillary Refill: Capillary refill takes less than 2 seconds  Neurological:      Comments: Intermittently opens eyes with purposeful movement of all extremities  Pupils equal and reactive  Intubated and sedated        --------------------------------------------------------------------------------------------------------------  Vitals:   Vitals:    05/30/22 0748 05/30/22 0830 05/30/22 0845 05/30/22 0900   BP:    125/94   BP Location:       Pulse:  68 67 67   Resp:  18 18 18   Temp:       TempSrc:       SpO2: 97% 96% 94% 92%   Weight:       Height:         Temp  Min: 97 1 °F (36 2 °C)  Max: 97 7 °F (36 5 °C)  IBW (Ideal Body Weight): 79 9 kg  Height: 6' 1" (185 4 cm)  Body mass index is 33 07 kg/m²  Laboratory and Diagnostics:  Results from last 7 days   Lab Units 05/30/22  0516 05/27/22  1541   WBC Thousand/uL 10 80* 10 62*   HEMOGLOBIN g/dL 14 1 14 9   HEMATOCRIT % 42 8 45 6   PLATELETS Thousands/uL 235 245   NEUTROS PCT % 73 79*   MONOS PCT % 8 6     Results from last 7 days   Lab Units 05/30/22  0516 05/27/22  1541   SODIUM mmol/L 139 140   POTASSIUM mmol/L 3 5 3 6   CHLORIDE mmol/L 105 102   CO2 mmol/L 22 29   ANION GAP mmol/L 12 9   BUN mg/dL 20 16   CREATININE mg/dL 1 24 1 29   CALCIUM mg/dL 9 4 9 7   GLUCOSE RANDOM mg/dL 116 120   ALT U/L 46  --    AST U/L 25  --    ALK PHOS U/L 76  --    ALBUMIN g/dL 3 9  --    TOTAL BILIRUBIN mg/dL 0 28  --      Results from last 7 days   Lab Units 05/30/22  0516   MAGNESIUM mg/dL 1 8*               Results from last 7 days   Lab Units 05/30/22  0516   LACTIC ACID mmol/L 1 2     ABG:  Results from last 7 days   Lab Units 05/30/22  0527   PH ART  7 406   PCO2 ART mm Hg 35 4*   PO2 ART mm Hg 397 4*   HCO3 ART mmol/L 21 7*   BASE EXC ART mmol/L -2 3     VBG:  Results from last 7 days   Lab Units 05/30/22  0834   PH NERI  7 418*   PCO2 NERI mm Hg 36 9*   PO2 NERI mm Hg 115 2*   HCO3 NERI mmol/L 23 3*   BASE EXC NERI mmol/L -0 8           Micro:        EKG: NSR rate 60  Imaging: I have personally reviewed pertinent reports  and I have personally reviewed pertinent films in PACS      Historical Information   History reviewed   No pertinent past medical history  Past Surgical History:   Procedure Laterality Date    HAND TENDON SURGERY       Social History   Social History     Substance and Sexual Activity   Alcohol Use Not Currently    Comment: socially     Social History     Substance and Sexual Activity   Drug Use Yes    Types: Heroin    Comment: last use 1100 hours 2022     Social History     Tobacco Use   Smoking Status Current Every Day Smoker    Packs/day: 1 00    Years: 20 00    Pack years: 20 00    Types: Cigarettes   Smokeless Tobacco Never Used       Family History:   Family History   Problem Relation Age of Onset    Substance Abuse Mother     Alcohol abuse Father     Cirrhosis Father     Cirrhosis Maternal Grandfather     Throat cancer Family          Medications:  Current Facility-Administered Medications   Medication Dose Route Frequency    dexmedeTOMIDine (Precedex) 400 mcg in sodium chloride 0 9% 100 mL  0 1-0 7 mcg/kg/hr Intravenous Titrated    enoxaparin (LOVENOX) subcutaneous injection 40 mg  40 mg Subcutaneous Daily    magnesium sulfate 2 g/50 mL IVPB (premix) 2 g  2 g Intravenous Once    multi-electrolyte (PLASMALYTE-A/ISOLYTE-S PH 7 4) IV solution  100 mL/hr Intravenous Continuous     Home medications:  Prior to Admission Medications   Prescriptions Last Dose Informant Patient Reported? Taking? buprenorphine-naloxone (Suboxone) 8-2 mg Unknown at Unknown time  No No   Sig: One or two strips sl q day  citalopram (CeleXA) 20 mg tablet Not Taking at Unknown time  No No   Sig: Take 1 tablet (20 mg total) by mouth daily   Patient not taking: No sig reported   naloxone (Narcan) 4 mg/0 1 mL nasal spray Unknown at Unknown time  No No   Si 1 mL (4 mg total) into each nostril as needed (respiratory depression or possible OD)      Facility-Administered Medications: None     Allergies:   Allergies   Allergen Reactions    Morphine GI Intolerance ------------------------------------------------------------------------------------------------------------  Advance Directive and Living Will:      Power of :    POLST:    ------------------------------------------------------------------------------------------------------------  Anticipated Length of Stay is > 2 midnights    Care Time Delivered:   Upon my evaluation, this patient had a high probability of imminent or life-threatening deterioration due to hypoxic respiratory failure, encephalopath, which required my direct attention, intervention, and personal management  I have personally provided 35 minutes ([phone] to [phone]) of critical care time, exclusive of procedures, teaching, family meetings, and any prior time recorded by providers other than myself  NICOLE Miramontes        Portions of the record may have been created with voice recognition software  Occasional wrong word or "sound a like" substitutions may have occurred due to the inherent limitations of voice recognition software    Read the chart carefully and recognize, using context, where substitutions have occurred

## 2022-05-30 NOTE — ASSESSMENT & PLAN NOTE
· Intubated for airway protection given toxic metabolic encephalopathy and agitation  · Continue ACVC 16/500/40/6, titrate FiO2 for SpO2>90  · Follow up on initial lab and imaging workup, if unremarkable will plan to wean to extubate  · Continue propofol and fentanyl, titrate for RASS 0, will add precedex will plans to wean to extubate  · Daily SAT/SBT

## 2022-05-30 NOTE — ASSESSMENT & PLAN NOTE
· Unclear etiology, possibly related to narcotic withdrawal vs substance abuse  · Coma panel unremarkable  · Will send UDS   · Start precedex infusion for agitation/anxiety, attempt to wean propofol and fentanyl to obtain neuro exam  · CT head unremarkable   · Regulate sleep/wake  · Delirium precuations

## 2022-05-30 NOTE — ED NOTES
Crisis provided pt with the information about inpatient detox, and outpatient information  Pt currently resilient to inpatient placement  Pt interested in outpatient resources  Pt was provided with the outpatient resources

## 2022-05-30 NOTE — NURSING NOTE
1642 Assumed care of pt at this time  VSS  Pt's assessment unremarkable  Family in with pt at this time  Previously, family gave pt a vape pen which was confiscated by previous RN and given to security  This RN reinforced hospital's policy on substance use in hospital  Pt acknowledges understanding

## 2022-05-30 NOTE — NURSING NOTE
Patient admitted to ICU 8 at 0830  ET tube removed shortly after arrival  Patient awake, but lethargic, following commands, passed bedside swallow evaluation  Soft restraints maintained per orders and for patient safety  Vital signs stable, will continue to monitor for changes

## 2022-05-30 NOTE — TRAUMA DOCUMENTATION
Orally suctioned for large amount brown vomit around ET tube    Patient still restless and moving and appears to be bucking against ET tube

## 2022-05-30 NOTE — ED PROCEDURE NOTE
PROCEDURE  ECG 12 Lead Documentation Only    Date/Time: 5/30/2022 4:51 AM  Performed by: Chelly Link MD  Authorized by: Chelly Link MD     Indications / Diagnosis:  Ams   ECG reviewed by me, the ED Provider: yes    Patient location:  ED  Interpretation:     Interpretation: non-specific    Rate:     ECG rate:  84    ECG rate assessment: normal    Rhythm:     Rhythm: sinus rhythm    Ectopy:     Ectopy: none    QRS:     QRS axis:  Normal    QRS intervals:  Normal  Conduction:     Conduction: normal    ST segments:     ST segments:  Non-specific  T waves:     T waves: non-specific           Chelly Link MD  05/30/22 1071

## 2022-05-30 NOTE — ED PROVIDER NOTES
History  No chief complaint on file  Pt brougt for AMS  GF called bc pt had agitated delirium   Concerned her may be over dosing on his Suboxone, that he just received as a prescription     EMS gave 400mg IM Ketamine initially for agitated delerium, then needed 4mg of Versed      History provided by:  EMS personnel  Altered Mental Status  Presenting symptoms: combativeness, confusion and disorientation    Severity:  Severe  Most recent episode: Today  Episode history:  Unable to specify  Chronicity:  New  Associated symptoms comment:  Unable to assess       Cannot display prior to admission medications because the patient has not been admitted in this contact  No past medical history on file  Past Surgical History:   Procedure Laterality Date    HAND TENDON SURGERY         Family History   Problem Relation Age of Onset    Substance Abuse Mother     Alcohol abuse Father     Cirrhosis Father     Cirrhosis Maternal Grandfather     Throat cancer Family      I have reviewed and agree with the history as documented  E-Cigarette/Vaping    E-Cigarette Use Never User      E-Cigarette/Vaping Substances    Nicotine No     THC No     CBD No     Flavoring No     Other No     Unknown No      Social History     Tobacco Use    Smoking status: Current Every Day Smoker     Packs/day: 1 00     Years: 20 00     Pack years: 20 00     Types: Cigarettes    Smokeless tobacco: Never Used   Vaping Use    Vaping Use: Never used   Substance Use Topics    Alcohol use: Not Currently     Comment: socially    Drug use: Yes     Types: Heroin     Comment: last use 1100 hours 5/26/2022       Review of Systems   Unable to perform ROS: Mental status change   Psychiatric/Behavioral: Positive for confusion         Primary Survey:   (A) Airway: intact  (B) Breathing: +breath sounds bilaterally  (C) Circulation: Pulses:   normal  (D) Disabliity:  GCS Total:  15  (E) Expose:  Completed        Physical Exam  Physical Exam  Constitutional:       General: He is not in acute distress  Appearance: He is well-developed  He is ill-appearing  He is not toxic-appearing or diaphoretic  HENT:      Head: Normocephalic and atraumatic  Right Ear: External ear normal       Left Ear: External ear normal       Nose: Nose normal    Eyes:      General: No scleral icterus  Right eye: No discharge  Left eye: No discharge  Extraocular Movements: Extraocular movements intact  Right eye: Normal extraocular motion  Left eye: Normal extraocular motion  Conjunctiva/sclera: Conjunctivae normal       Pupils: Pupils are equal, round, and reactive to light  Neck:      Vascular: No JVD  Trachea: No tracheal deviation  Cardiovascular:      Rate and Rhythm: Normal rate and regular rhythm  Pulses: Normal pulses  Heart sounds: Normal heart sounds  No murmur heard  No friction rub  No gallop  Pulmonary:      Effort: Pulmonary effort is normal  No respiratory distress  Breath sounds: Normal breath sounds  No stridor  No wheezing, rhonchi or rales  Chest:      Chest wall: No tenderness  Abdominal:      General: Bowel sounds are normal  There is no distension  Palpations: Abdomen is soft  There is no mass  Tenderness: There is no abdominal tenderness  There is no right CVA tenderness, left CVA tenderness, guarding or rebound  Hernia: No hernia is present  Musculoskeletal:         General: No swelling, tenderness, deformity or signs of injury  Normal range of motion  Cervical back: Normal range of motion and neck supple  No rigidity or tenderness  Right lower leg: No edema  Left lower leg: No edema  Lymphadenopathy:      Cervical: No cervical adenopathy  Skin:     General: Skin is warm  Capillary Refill: Capillary refill takes less than 2 seconds  Coloration: Skin is not jaundiced or pale  Findings: No bruising, erythema, lesion or rash  Neurological:      General: No focal deficit present  Mental Status: He is alert  GCS: GCS eye subscore is 1  GCS verbal subscore is 1  GCS motor subscore is 4  Cranial Nerves: No cranial nerve deficit  Motor: No weakness or abnormal muscle tone  Psychiatric:      Comments: delirious affect  Unresponsive  Trauma Secondary exam performed (must be performed and documented on all traumas): GCS 15, full ROM of bilateral upper and lower extremities  Airway intact, bilateral breath sounds, palpable pulses  No active bleeding  No bony point tenderness in extremities, chest, abdomen or c/t,l spine  No crepitus, abdomen soft/non tender  Chest wall soft non tender with no deformities  Pelvis stable  Fast or pelvic xray prior to ordering a CT a/p? Yes     Stat portable CXR prior to going to CT chest? Yes   FAST Results: Negative    Document C-spine clearance after CT c-spine came back normal - cspineclearsmartlist: Of note, C-spine clear after CT scans resulted  Vital Signs  ED Triage Vitals   Temp Pulse Resp BP SpO2   -- -- -- -- --      Temp src Heart Rate Source Patient Position - Orthostatic VS BP Location FiO2 (%)   -- -- -- -- --      Pain Score       --           There were no vitals filed for this visit  Visual Acuity      ED Medications  Medications - No data to display    Diagnostic Studies  Results Reviewed     None                 No orders to display              Procedures  Procedures         ED Course  ED Course as of 05/31/22 0328   Mon May 30, 2022   0456 Dw wife  Pt has hit his head multiple times  Due to AMS, will make pt trauma alert  Will intubate for airway protection   Wife aware   2495 Intubated on first attempt  Very difficult airway, due to trauma, as well as body habitus    0515 Pt stable  Awaiting stability for transfer to CT scan    0543 Pt has been very difficult to sedate  He has needed several doses of Propofol, in addition to IM Ketamine  Will order a dose of Vecuronium, as needed, for CT    0545 CBC and differential(!)   0545 Blood gas, arterial(!)   0547 LACTIC ACID: 1 2   0547 Ammonia(!): 86   0552 Pt over to CT scan   0554 Total CK: 4320 Madhuri Aly texted RT to decrease RR and Fio2   0613 Pt back from CT  Needs more sedation  Fentanyl drip ordered    0614 Reaching out to Dr Molly Whitehead, ICU attending  No answer   Tiger texted   5079 CT scans reviewed  No gross signs of trauma   4464 Ran case by Dr Molly Whitehead  He feels if CT scans are negative for trauma, pt can stay here  States I can ask the AP to come see the pt and start writing orders   [address] [facility]ENIFFER with critical care  She is coming down to see the pt                                              MDM    Disposition  Final diagnoses:   None     ED Disposition     None      Follow-up Information    None         Patient's Medications   Discharge Prescriptions    No medications on file       No discharge procedures on file      PDMP Review       Value Time User    PDMP Reviewed  Yes 5/27/2022 12:34 PM [facility], DO          ED Provider  Electronically Signed by           Jacobo Bowling MD  05/31/22 6584

## 2022-05-30 NOTE — ASSESSMENT & PLAN NOTE
· Likely reactive, do not suspect any current infection  · UA, CXR, and CT AP unremarkable  · Monitor off abx

## 2022-05-30 NOTE — TRAUMA DOCUMENTATION
Per the girl friend patient fell and hit his head several times, therefore per Dr Vera Medicine orders patient will be evaluated as a trauma patient

## 2022-05-30 NOTE — NURSING NOTE
Patient awake and alert  Disoriented to situation  Requesting to go home earlier, provider made aware, conversation had with patient regarding risks and benefits at this time, patient agreeable to staying at this time  Calm and cooperative  Vital signs stable  Will continue to monitor for changes

## 2022-05-30 NOTE — RESPIRATORY THERAPY NOTE
05/30/22 0830   Respiratory Assessment   Assessment Type Assess only   General Appearance Awake;Drowsy; Alert   Respiratory Pattern Symmetrical;Spontaneous;Assisted   Chest Assessment Chest expansion symmetrical   Bilateral Breath Sounds Clear   Suction ET Tube   Resp Comments pt arrived to ICU 0815 remained on vent MR1 CMV 16 500 40% +6, trial on SIMV 8 500 40% +6, given 10 min  in room, pt off sedation , following all commands, per  ok to extubate, pt has spont breaths of 1348 Rr 18, pt extubated to NC 2 l/m no stridor present   O2 Device NC   Vent Information   Vent type   (MR1)   Ventilator End Yes   $ Pulse Oximetry Spot Check Charge Completed   SpO2 96 %   SIMV Settings   Resp Rate (BPM) 8 BPM   VT (mL) 500 mL   FiO2 (%) 40 %   PEEP (cmH2O) 6 cmH2O   Pressure Support (cmH2O) 6 cmH2O   Insp Time (S) 1 sec   Flow Trigger (LPM) 5 LPM   SIMV Actuals   Resp Rate (BPM) 18 BPM   VT (mL) 1348   MV (Obs) 10 7   MAP (cm H2O) 10   Peak Pressure (cmH2O) 17 cmH2O   I:E Ratio (Obs) 1:2 1   SIMV ALARMS   High Peak Pressure (cmH2O) 40 cm H2O   High Resp Rate (BPM) 8 BPM   High MV (L/min) 15 L/min   Low MV (L/min) 6 L/min   High VT (mL) 1400 mL   Low VT (mL) 300 mL   Apnea Time (s) 20 S   Leak (%) 0 %   SIMV Apnea Settings   Resp Rate (BPM) 12 BPM   VT (mL) 500 mL   Apnea Time (S) 20 secs   %TI (%) 1 %   Maintenance   Alarm (pink) cable attached No   Resuscitation bag with peep valve at bedside Yes   Water bag changed No   Circuit changed No   Daily Screen   Patient safety screen outcome: Passed   Spont breathing trial % for 30 min Yes  (Dr in room pt alert awake)   Spont breathing trial outcome: Passed   Name of Medical Team Notified: Dr Alanna Viveros in room   Preparing to extubate/ Notify Nurse Yes   Extubation order obtained Yes   Consider Cuff Test No (comment)   Patient extubated Yes   RT Ventilator Management Note  [place] 30 y o  male MRN: [id number]  Unit/Bed#: ICU 08-01 Encounter: [id number]      Break-the-Glass       Encounters that might contain data have been deemed confidential and restricted               Daily Screen         5/30/2022  0830             Patient safety screen outcome[de-identified] Passed (P)     Spont breathing trial % for 30 min: Yes (P)     Spont breathing trial outcome[de-identified] Passed (P)     Name of Medical Team Notified[de-identified] Dr Kala Servin in room (P)     Preparing to extubate/ Notify Nurse: Yes (P)     Extubation order obtained: Yes (P)     Consider Cuff Test: No (comment) (P)     Patient extubated: Yes (P)               Physical Exam:   Assessment Type: Assess only  General Appearance: Awake, Drowsy, Alert  Respiratory Pattern: Symmetrical, Spontaneous, Assisted  Chest Assessment: Chest expansion symmetrical  Bilateral Breath Sounds: Clear  Suction: ET Tube  O2 Device: NC      Resp Comments: (P) pt arrived to ICU 0815 remained on vent MR1 CMV 16 500 40% +6, trial on SIMV 8 500 40% +6, given 10 min Dr in room, pt off sedation , following all commands, per  ok to extubate, pt has spont breaths of 1348 Rr 18, pt extubated to NC 2 l/m no stridor present

## 2022-05-30 NOTE — ASSESSMENT & PLAN NOTE
· Unclear etiology, CT shows fatty liver, LFTs unremarkable  · Consider lactulose if ongoing encephalopathy

## 2022-05-31 ENCOUNTER — TRANSITIONAL CARE MANAGEMENT (OUTPATIENT)
Dept: FAMILY MEDICINE CLINIC | Facility: CLINIC | Age: 37
End: 2022-05-31

## 2022-05-31 VITALS
TEMPERATURE: 98 F | OXYGEN SATURATION: 96 % | WEIGHT: 239.42 LBS | DIASTOLIC BLOOD PRESSURE: 75 MMHG | SYSTOLIC BLOOD PRESSURE: 133 MMHG | HEART RATE: 54 BPM | HEIGHT: 73 IN | RESPIRATION RATE: 16 BRPM | BODY MASS INDEX: 31.73 KG/M2

## 2022-05-31 PROBLEM — J96.01 ACUTE RESPIRATORY FAILURE WITH HYPOXIA (HCC): Status: RESOLVED | Noted: 2022-05-30 | Resolved: 2022-05-31

## 2022-05-31 PROBLEM — G92.8 TOXIC METABOLIC ENCEPHALOPATHY: Status: RESOLVED | Noted: 2022-05-30 | Resolved: 2022-05-31

## 2022-05-31 PROBLEM — D72.829 LEUKOCYTOSIS: Status: RESOLVED | Noted: 2022-05-30 | Resolved: 2022-05-31

## 2022-05-31 LAB
ABO GROUP BLD: NORMAL
ALBUMIN SERPL BCP-MCNC: 4.1 G/DL (ref 3.5–5)
ALP SERPL-CCNC: 92 U/L (ref 34–104)
ALT SERPL W P-5'-P-CCNC: 38 U/L (ref 7–52)
ANION GAP SERPL CALCULATED.3IONS-SCNC: 6 MMOL/L (ref 4–13)
AST SERPL W P-5'-P-CCNC: 33 U/L (ref 13–39)
ATRIAL RATE: 84 BPM
BASOPHILS # BLD AUTO: 0.05 THOUSANDS/ΜL (ref 0–0.1)
BASOPHILS NFR BLD AUTO: 1 % (ref 0–1)
BILIRUB SERPL-MCNC: 0.51 MG/DL (ref 0.2–1)
BUN SERPL-MCNC: 12 MG/DL (ref 5–25)
CALCIUM SERPL-MCNC: 9.5 MG/DL (ref 8.4–10.2)
CHLORIDE SERPL-SCNC: 105 MMOL/L (ref 96–108)
CO2 SERPL-SCNC: 29 MMOL/L (ref 21–32)
CREAT SERPL-MCNC: 1.13 MG/DL (ref 0.6–1.3)
EOSINOPHIL # BLD AUTO: 0.18 THOUSAND/ΜL (ref 0–0.61)
EOSINOPHIL NFR BLD AUTO: 2 % (ref 0–6)
ERYTHROCYTE [DISTWIDTH] IN BLOOD BY AUTOMATED COUNT: 12.8 % (ref 11.6–15.1)
GFR SERPL CREATININE-BSD FRML MDRD: 83 ML/MIN/1.73SQ M
GLUCOSE SERPL-MCNC: 111 MG/DL (ref 65–140)
HCT VFR BLD AUTO: 45.2 % (ref 36.5–49.3)
HGB BLD-MCNC: 14.7 G/DL (ref 12–17)
IMM GRANULOCYTES # BLD AUTO: 0.04 THOUSAND/UL (ref 0–0.2)
IMM GRANULOCYTES NFR BLD AUTO: 0 % (ref 0–2)
LYMPHOCYTES # BLD AUTO: 2.89 THOUSANDS/ΜL (ref 0.6–4.47)
LYMPHOCYTES NFR BLD AUTO: 31 % (ref 14–44)
MAGNESIUM SERPL-MCNC: 2.1 MG/DL (ref 1.9–2.7)
MCH RBC QN AUTO: 29.9 PG (ref 26.8–34.3)
MCHC RBC AUTO-ENTMCNC: 32.5 G/DL (ref 31.4–37.4)
MCV RBC AUTO: 92 FL (ref 82–98)
MONOCYTES # BLD AUTO: 0.64 THOUSAND/ΜL (ref 0.17–1.22)
MONOCYTES NFR BLD AUTO: 7 % (ref 4–12)
NEUTROPHILS # BLD AUTO: 5.45 THOUSANDS/ΜL (ref 1.85–7.62)
NEUTS SEG NFR BLD AUTO: 59 % (ref 43–75)
NRBC BLD AUTO-RTO: 0 /100 WBCS
P AXIS: 59 DEGREES
PHOSPHATE SERPL-MCNC: 3.2 MG/DL (ref 2.7–4.5)
PLATELET # BLD AUTO: 247 THOUSANDS/UL (ref 149–390)
PMV BLD AUTO: 9.1 FL (ref 8.9–12.7)
POTASSIUM SERPL-SCNC: 3.8 MMOL/L (ref 3.5–5.3)
PR INTERVAL: 150 MS
PROT SERPL-MCNC: 6.7 G/DL (ref 6.4–8.4)
QRS AXIS: 0 DEGREES
QRSD INTERVAL: 98 MS
QT INTERVAL: 410 MS
QTC INTERVAL: 484 MS
RBC # BLD AUTO: 4.91 MILLION/UL (ref 3.88–5.62)
RH BLD: POSITIVE
SODIUM SERPL-SCNC: 140 MMOL/L (ref 135–147)
T WAVE AXIS: 23 DEGREES
VENTRICULAR RATE: 84 BPM
WBC # BLD AUTO: 9.25 THOUSAND/UL (ref 4.31–10.16)

## 2022-05-31 PROCEDURE — 84100 ASSAY OF PHOSPHORUS: CPT | Performed by: NURSE PRACTITIONER

## 2022-05-31 PROCEDURE — 93010 ELECTROCARDIOGRAM REPORT: CPT | Performed by: INTERNAL MEDICINE

## 2022-05-31 PROCEDURE — 83735 ASSAY OF MAGNESIUM: CPT | Performed by: NURSE PRACTITIONER

## 2022-05-31 PROCEDURE — 85025 COMPLETE CBC W/AUTO DIFF WBC: CPT | Performed by: NURSE PRACTITIONER

## 2022-05-31 PROCEDURE — NC001 PR NO CHARGE: Performed by: NURSE PRACTITIONER

## 2022-05-31 PROCEDURE — 99238 HOSP IP/OBS DSCHRG MGMT 30/<: CPT | Performed by: ANESTHESIOLOGY

## 2022-05-31 PROCEDURE — 80053 COMPREHEN METABOLIC PANEL: CPT | Performed by: NURSE PRACTITIONER

## 2022-05-31 RX ORDER — LANOLIN ALCOHOL/MO/W.PET/CERES
6 CREAM (GRAM) TOPICAL
Status: DISCONTINUED | OUTPATIENT
Start: 2022-05-31 | End: 2022-05-31 | Stop reason: HOSPADM

## 2022-05-31 RX ADMIN — NICOTINE 21 MG: 21 PATCH, EXTENDED RELEASE TRANSDERMAL at 08:07

## 2022-05-31 RX ADMIN — FAMOTIDINE 20 MG: 20 TABLET ORAL at 08:06

## 2022-05-31 RX ADMIN — Medication 6 MG: at 02:23

## 2022-05-31 NOTE — UTILIZATION REVIEW
Initial Clinical Review    Admission: Date/Time/Statement:   Admission Orders (From admission, onward)     Ordered        05/30/22 0746  INPATIENT ADMISSION  Once                      Orders Placed This Encounter   Procedures    INPATIENT ADMISSION     Standing Status:   Standing     Number of Occurrences:   1     Order Specific Question:   Level of Care     Answer:   Critical Care [15]     Order Specific Question:   Estimated length of stay     Answer:   More than 2 Midnights     Order Specific Question:   Certification     Answer:   I certify that inpatient services are medically necessary for this patient for a duration of greater than two midnights  See H&P and MD Progress Notes for additional information about the patient's course of treatment  ED Arrival Information     Expected   -    Arrival   5/30/2022 04:41    Acuity   Emergent            Means of arrival   Ambulance    Escorted by   [facility]    Service   Critical Care/ICU    Admission type   Emergency            Arrival complaint   falling           Chief Complaint   Patient presents with    Altered Mental Status     Brought in with EMS after girl friend called 911  Patient discharged 1 to 2 weeks ago from this facility  Per girl friend he left here after receiving meds and feeling better, tonight patient stumbling around in the house and not acting right, girl friend thought he took too much Suboxone and she gave him narcan and then soon he started throwing things around in the house  Initial Presentation: 30 y o  male to the ED from home home via EMS with complaints of change in mental status, agitation possible overdose on Suboxone  Admitted to [facility] [facility]  for acute respiratory failure with hypoxia, toxic metabolic encephalopathy, hyperammonemia  REcently started on Suboxone and given higher dose on 5/27 due to withdrawal symptoms  Used fentanyl, heroine on 5/28  Advised for IP detox at that time, declined    On arrival, he is ill appearing, GCS 6, delirious, unresponsive  Intubated for airway protection  Started on precedex, propofol, fentanyl  Unclear etiology of elevated ammonia  CT shows fatty liver, LFTs WNL  Encephalopathy with unclear etiology, possibly due to narcotic withdrawal vs substance use    5/30 Update:  Soon after arrival to ICU, extubated, awake, following commands  Date: 5/31   Day 2:   CT head neg for acute abnormality  H/O heroin abuse, suboxone, unclear if compliant  GCS 15  Consider toxicology consult       ED Triage Vitals   Temperature Pulse Respirations Blood Pressure SpO2   05/30/22 0503 05/30/22 0447 05/30/22 0447 05/30/22 0447 05/30/22 0447   97 7 °F (36 5 °C) 94 20 137/97 99 %      Temp Source Heart Rate Source Patient Position - Orthostatic VS BP Location FiO2 (%)   05/30/22 0503 05/30/22 0447 05/30/22 0447 05/30/22 0447 --   Temporal Monitor Lying Left arm       Pain Score       05/30/22 0447       No Pain          Wt Readings from Last 1 Encounters:   05/31/22 109 kg (239 lb 6 7 oz)     Additional Vital Signs:   /Time Temp Pulse Resp BP MAP (mmHg) SpO2 Calculated FIO2 (%) - Nasal Cannula Nasal Cannula O2 Flow Rate (L/min) O2 Device Patient Position - Orthostatic VS   05/31/22 0800 -- 54 Abnormal  16 133/75 99 96 % -- -- -- --   05/31/22 0709 98 °F (36 7 °C) -- -- -- -- -- -- -- -- --   05/31/22 0600 -- 62 45 Abnormal  -- -- 98 % -- -- -- --   05/31/22 0500 -- 52 Abnormal  13 143/90 111 95 % -- -- -- --   05/31/22 0400 -- 52 Abnormal  16 130/68 94 96 % -- -- -- --   05/30/22 1500 97 9 °F (36 6 °C) 80 20 134/91 107 95 % -- -- -- --   05/30/22 1400 -- 75 32 Abnormal  119/84 98 95 % -- -- -- --   05/30/22 1300 -- 70 24 Abnormal  127/85 102 96 % -- -- -- --   05/30/22 1200 -- 72 30 Abnormal  118/76 92 95 % -- -- -- --   05/30/22 1100 97 8 °F (36 6 °C) 55 14 120/76 93 97 % -- -- -- --   05/30/22 1000 -- 67 20 115/83 95 95 % -- -- -- --   05/30/22 0900 97 °F (36 1 °C) Abnormal  67 18 125/94 105 92 % -- -- -- --   05/30/22 0845 -- 67 18 -- -- 94 % -- -- -- --   05/30/22 0830 -- 68 18 -- -- 96 % 28 2 L/min Nasal cannula --   05/30/22 0748 -- -- -- -- -- 97 % -- -- -- --   05/30/22 0730 -- 65 -- 119/76 93 98 % -- -- -- --   05/30/22 0525 -- 94 16 160/113 Abnormal  -- -- -- -- -- --   05/30/22 0515 97 1 °F (36 2 °C) Abnormal  91 16 138/86 -- 99 % -- -- Other (comment)  Lying   O2 Device: ET tube at 05/30/22 0515   05/30/22 0510 -- -- -- -- -- 99 % -- -- -- --   05/30/22 0508 -- -- -- -- -- -- -- -- Other (comment)  --   O2 Device: intubated at 05/30/22 0508   05/30/22 0505 -- 75 -- -- -- 100 % -- -- -- --   05/30/22 05:03:20 97 7 °F (36 5 °C) -- -- -- -- -- -- -- -- --   05/30/22 0500 -- 79 -- 138/95 -- 97 % -- -- -- --   05/30/22 04:58:14 -- -- -- -- -- 90 % -- -- -- --   05/30/22 0447 -- 94 20 137/97 -- 99 % -- -- None (Room air) Lying       Pertinent Labs/Diagnostic Test Results:   5/30 EKG:Normal sinus rhythm  Normal ECG  When compared with ECG of 30-MAY-2022 04:51, (unconfirmed)  No significant change was found    CT chest abdomen pelvis wo contrast   Final Result by Magui Livingston DO (05/30 0672)      No calvarial fracture or acute intracranial abnormality is seen  Other findings as above  CT CERVICAL SPINE - WITHOUT CONTRAST      INDICATION:   Head trauma, moderate-severe   TRAUMA  COMPARISON:  None  TECHNIQUE:  CT examination of the cervical spine was performed without intravenous contrast   Contiguous axial images were obtained  Sagittal and coronal reconstructions were performed  Radiation dose length product (DLP) for this visit:  970 68 mGy-cm  (accession [id number]), 2014 72 mGy-cm  (accession [id number]), 557 82 mGy-cm  (accession [id number])    This examination, like all CT scans performed in the [facility], was    performed utilizing techniques to minimize radiation dose exposure, including the use of iterative reconstruction and automated exposure control  IMAGE QUALITY:  Diagnostic  FINDINGS:      ALIGNMENT:  Normal alignment of the cervical spine  No subluxation  VERTEBRAL BODIES:  No acute fracture  DEGENERATIVE CHANGES:  Intervertebral disc space narrowing at C5/C6 and C6/C7 with anterior, marginal, and uncovertebral osteophytosis at these levels  PREVERTEBRAL AND PARASPINAL SOFT TISSUES:  Unremarkable  THORACIC INLET:  Please refer to the concurrent chest, abdomen, and pelvic CT report for description of the thoracic inlet findings  IMPRESSION:      Degenerative changes as described but no evidence of acute cervical spine injury  Other findings as above  CT CHEST, ABDOMEN AND PELVIS WITHOUT IV CONTRAST      INDICATION:   Head trauma, moderate-severe   TRAUMA  COMPARISON:  None  TECHNIQUE: CT examination of the chest, abdomen and pelvis was performed without intravenous contrast  This examination was performed without intravenous contrast in the context of the critical nationwide Omnipaque shortage  Axial, sagittal, and coronal    2D reformatted images were created from the source data and submitted for interpretation  Radiation dose length product (DLP) for this visit:  970 68 mGy-cm  (accession [id number]), 2014 72 mGy-cm  (accession [id number]), 557 82 mGy-cm  (accession [id number])  This examination, like all CT scans performed in the [facility], was    performed utilizing techniques to minimize radiation dose exposure, including the use of iterative reconstruction and automated exposure control  Enteric contrast was not administered  FINDINGS:      CHEST      LUNGS/PLEURA: 4 mm nodule in the right upper lobe (axial image 30, series 2)  Tiny dependent pleural effusions suspected with associated passive atelectasis  Lungs otherwise appear grossly clear  HEART/GREAT VESSELS: Heart is unremarkable for patient's age  No thoracic aortic aneurysm  MEDIASTINUM AND CARA:  Endotracheal tube terminates approximately 3 8 cm from the sugey; otherwise grossly unremarkable  CHEST WALL AND LOWER NECK:  Unremarkable  ABDOMEN      LIVER/BILIARY TREE:  Liver is diffusely decreased in density consistent with fatty change  No CT evidence of suspicious hepatic mass  Normal hepatic contours  No biliary dilatation  GALLBLADDER:  No calcified gallstones  No pericholecystic inflammatory change  SPLEEN:  Unremarkable  PANCREAS:  Unremarkable  ADRENAL GLANDS:  Unremarkable  KIDNEYS/URETERS:  Unremarkable  No hydronephrosis  STOMACH AND BOWEL:  Enteric feeding tube terminates in the stomach  No evidence of bowel obstruction  Otherwise grossly unremarkable  APPENDIX:  No findings to suggest appendicitis  ABDOMINOPELVIC CAVITY:  No ascites  No pneumoperitoneum  No lymphadenopathy  VESSELS:  Unremarkable for patient's age  PELVIS      REPRODUCTIVE ORGANS:  Unremarkable for patient's age  URINARY BLADDER:  Unremarkable  ABDOMINAL WALL/INGUINAL REGIONS:  Unremarkable  OSSEOUS STRUCTURES:  Bilateral L5 spondylolysis  Intervertebral disc space narrowing and vacuum disc phenomenon at L5/S1, approximately 3 mm of spondylolisthesis of L5 on S1  No acute fracture or destructive osseous lesion  IMPRESSION:        No evidence of acute traumatic injury  Tiny dependent pleural effusions suspected with associated passive atelectasis  Lungs otherwise appear grossly clear  Fatty infiltration of the liver is suspected  In the setting of abdominal pain and/or elevated liver function tests, consider steatohepatitis  Grade 1 spondylolisthesis of L5/S1  Other findings as above  Workstation performed: [id number]         TRAUMA - CT head wo contrast   Final Result by Juliana Melendez DO (05/30 9141)      No calvarial fracture or acute intracranial abnormality is seen  Other findings as above  CT CERVICAL SPINE - WITHOUT CONTRAST      INDICATION:   Head trauma, moderate-severe   TRAUMA  COMPARISON:  None  TECHNIQUE:  CT examination of the cervical spine was performed without intravenous contrast   Contiguous axial images were obtained  Sagittal and coronal reconstructions were performed  Radiation dose length product (DLP) for this visit:  970 68 mGy-cm  (accession [id number]), 2014 72 mGy-cm  (accession [id number]), 557 82 mGy-cm  (accession [id number])  This examination, like all CT scans performed in the [facility], was    performed utilizing techniques to minimize radiation dose exposure, including the use of iterative reconstruction and automated exposure control  IMAGE QUALITY:  Diagnostic  FINDINGS:      ALIGNMENT:  Normal alignment of the cervical spine  No subluxation  VERTEBRAL BODIES:  No acute fracture  DEGENERATIVE CHANGES:  Intervertebral disc space narrowing at C5/C6 and C6/C7 with anterior, marginal, and uncovertebral osteophytosis at these levels  PREVERTEBRAL AND PARASPINAL SOFT TISSUES:  Unremarkable  THORACIC INLET:  Please refer to the concurrent chest, abdomen, and pelvic CT report for description of the thoracic inlet findings  IMPRESSION:      Degenerative changes as described but no evidence of acute cervical spine injury  Other findings as above  CT CHEST, ABDOMEN AND PELVIS WITHOUT IV CONTRAST      INDICATION:   Head trauma, moderate-severe   TRAUMA  COMPARISON:  None  TECHNIQUE: CT examination of the chest, abdomen and pelvis was performed without intravenous contrast  This examination was performed without intravenous contrast in the context of the critical nationwide Omnipaque shortage  Axial, sagittal, and coronal    2D reformatted images were created from the source data and submitted for interpretation        Radiation dose length product (DLP) for this visit:  970 68 mGy-cm  (accession [id number]), 2014 72 mGy-cm  (accession [id number]), 557 82 mGy-cm  (accession [id number])  This examination, like all CT scans performed in the [facility], was    performed utilizing techniques to minimize radiation dose exposure, including the use of iterative reconstruction and automated exposure control  Enteric contrast was not administered  FINDINGS:      CHEST      LUNGS/PLEURA: 4 mm nodule in the right upper lobe (axial image 30, series 2)  Tiny dependent pleural effusions suspected with associated passive atelectasis  Lungs otherwise appear grossly clear  HEART/GREAT VESSELS: Heart is unremarkable for patient's age  No thoracic aortic aneurysm  MEDIASTINUM AND CARA:  Endotracheal tube terminates approximately 3 8 cm from the sugey; otherwise grossly unremarkable  CHEST WALL AND LOWER NECK:  Unremarkable  ABDOMEN      LIVER/BILIARY TREE:  Liver is diffusely decreased in density consistent with fatty change  No CT evidence of suspicious hepatic mass  Normal hepatic contours  No biliary dilatation  GALLBLADDER:  No calcified gallstones  No pericholecystic inflammatory change  SPLEEN:  Unremarkable  PANCREAS:  Unremarkable  ADRENAL GLANDS:  Unremarkable  KIDNEYS/URETERS:  Unremarkable  No hydronephrosis  STOMACH AND BOWEL:  Enteric feeding tube terminates in the stomach  No evidence of bowel obstruction  Otherwise grossly unremarkable  APPENDIX:  No findings to suggest appendicitis  ABDOMINOPELVIC CAVITY:  No ascites  No pneumoperitoneum  No lymphadenopathy  VESSELS:  Unremarkable for patient's age  PELVIS      REPRODUCTIVE ORGANS:  Unremarkable for patient's age  URINARY BLADDER:  Unremarkable  ABDOMINAL WALL/INGUINAL REGIONS:  Unremarkable  OSSEOUS STRUCTURES:  Bilateral L5 spondylolysis    Intervertebral disc space narrowing and vacuum disc phenomenon at L5/S1, approximately 3 mm of spondylolisthesis of L5 on S1  No acute fracture or destructive osseous lesion  IMPRESSION:        No evidence of acute traumatic injury  Tiny dependent pleural effusions suspected with associated passive atelectasis  Lungs otherwise appear grossly clear  Fatty infiltration of the liver is suspected  In the setting of abdominal pain and/or elevated liver function tests, consider steatohepatitis  Grade 1 spondylolisthesis of L5/S1  Other findings as above  Workstation performed: [id number]         TRAUMA - CT spine cervical wo contrast   Final Result by Belinda Torres DO (05/30 5861)      No calvarial fracture or acute intracranial abnormality is seen  Other findings as above  CT CERVICAL SPINE - WITHOUT CONTRAST      INDICATION:   Head trauma, moderate-severe   TRAUMA  COMPARISON:  None  TECHNIQUE:  CT examination of the cervical spine was performed without intravenous contrast   Contiguous axial images were obtained  Sagittal and coronal reconstructions were performed  Radiation dose length product (DLP) for this visit:  970 68 mGy-cm  (accession [id number]), 2014 72 mGy-cm  (accession [id number]), 557 82 mGy-cm  (accession [id number])  This examination, like all CT scans performed in the [facility], was    performed utilizing techniques to minimize radiation dose exposure, including the use of iterative reconstruction and automated exposure control  IMAGE QUALITY:  Diagnostic  FINDINGS:      ALIGNMENT:  Normal alignment of the cervical spine  No subluxation  VERTEBRAL BODIES:  No acute fracture  DEGENERATIVE CHANGES:  Intervertebral disc space narrowing at C5/C6 and C6/C7 with anterior, marginal, and uncovertebral osteophytosis at these levels  PREVERTEBRAL AND PARASPINAL SOFT TISSUES:  Unremarkable        THORACIC INLET:  Please refer to the concurrent chest, abdomen, and pelvic CT report for description of the thoracic inlet findings  IMPRESSION:      Degenerative changes as described but no evidence of acute cervical spine injury  Other findings as above  CT CHEST, ABDOMEN AND PELVIS WITHOUT IV CONTRAST      INDICATION:   Head trauma, moderate-severe   TRAUMA  COMPARISON:  None  TECHNIQUE: CT examination of the chest, abdomen and pelvis was performed without intravenous contrast  This examination was performed without intravenous contrast in the context of the critical nationwide Omnipaque shortage  Axial, sagittal, and coronal    2D reformatted images were created from the source data and submitted for interpretation  Radiation dose length product (DLP) for this visit:  970 68 mGy-cm  (accession [id number]), 2014 72 mGy-cm  (accession [id number]), 557 82 mGy-cm  (accession [id number])  This examination, like all CT scans performed in the [facility], was    performed utilizing techniques to minimize radiation dose exposure, including the use of iterative reconstruction and automated exposure control  Enteric contrast was not administered  FINDINGS:      CHEST      LUNGS/PLEURA: 4 mm nodule in the right upper lobe (axial image 30, series 2)  Tiny dependent pleural effusions suspected with associated passive atelectasis  Lungs otherwise appear grossly clear  HEART/GREAT VESSELS: Heart is unremarkable for patient's age  No thoracic aortic aneurysm  MEDIASTINUM AND CARA:  Endotracheal tube terminates approximately 3 8 cm from the sugey; otherwise grossly unremarkable  CHEST WALL AND LOWER NECK:  Unremarkable  ABDOMEN      LIVER/BILIARY TREE:  Liver is diffusely decreased in density consistent with fatty change  No CT evidence of suspicious hepatic mass  Normal hepatic contours  No biliary dilatation  GALLBLADDER:  No calcified gallstones   No pericholecystic inflammatory change  SPLEEN:  Unremarkable  PANCREAS:  Unremarkable  ADRENAL GLANDS:  Unremarkable  KIDNEYS/URETERS:  Unremarkable  No hydronephrosis  STOMACH AND BOWEL:  Enteric feeding tube terminates in the stomach  No evidence of bowel obstruction  Otherwise grossly unremarkable  APPENDIX:  No findings to suggest appendicitis  ABDOMINOPELVIC CAVITY:  No ascites  No pneumoperitoneum  No lymphadenopathy  VESSELS:  Unremarkable for patient's age  PELVIS      REPRODUCTIVE ORGANS:  Unremarkable for patient's age  URINARY BLADDER:  Unremarkable  ABDOMINAL WALL/INGUINAL REGIONS:  Unremarkable  OSSEOUS STRUCTURES:  Bilateral L5 spondylolysis  Intervertebral disc space narrowing and vacuum disc phenomenon at L5/S1, approximately 3 mm of spondylolisthesis of L5 on S1  No acute fracture or destructive osseous lesion  IMPRESSION:        No evidence of acute traumatic injury  Tiny dependent pleural effusions suspected with associated passive atelectasis  Lungs otherwise appear grossly clear  Fatty infiltration of the liver is suspected  In the setting of abdominal pain and/or elevated liver function tests, consider steatohepatitis  Grade 1 spondylolisthesis of L5/S1  Other findings as above  Workstation performed: [id number]         XR chest 1 view portable   ED Interpretation by Rajani Johnston MD (05/30 0209)   No trauma  ETT 2cm above the sugey       Final Result by Reggie Mcbride MD (05/30 4920)      No acute cardiopulmonary disease  ET tube in place, tip 5 6 cm above the sugey  Workstation performed: [id number]         XR Trauma pelvis ap only 1 or 2 vw   ED Interpretation by Rajani Johnston MD (05/30 5365)   No trauma       Final Result by Reggie Mcbride MD (05/30 5817)      No acute osseous abnormality        Workstation performed: [id number]               Results from last 7 days   Lab Units 05/31/22  0530 05/30/22  0516 05/27/22  1541   WBC Thousand/uL 9 25 10 80* 10 62*   HEMOGLOBIN g/dL 14 7 14 1 14 9   HEMATOCRIT % 45 2 42 8 45 6   PLATELETS Thousands/uL 247 235 245   NEUTROS ABS Thousands/µL 5 45 8 01* 8 45*         Results from last 7 days   Lab Units 05/31/22  0530 05/30/22  0516 05/27/22  1541   SODIUM mmol/L 140 139 140   POTASSIUM mmol/L 3 8 3 5 3 6   CHLORIDE mmol/L 105 105 102   CO2 mmol/L 29 22 29   ANION GAP mmol/L 6 12 9   BUN mg/dL 12 20 16   CREATININE mg/dL 1 13 1 24 1 29   EGFR ml/min/1 73sq m 83 74 71   CALCIUM mg/dL 9 5 9 4 9 7   MAGNESIUM mg/dL 2 1 1 8*  --    PHOSPHORUS mg/dL 3 2  --   --      Results from last 7 days   Lab Units 05/31/22  0530 05/30/22  0516   AST U/L 33 25   ALT U/L 38 46   ALK PHOS U/L 92 76   TOTAL PROTEIN g/dL 6 7 6 4   ALBUMIN g/dL 4 1 3 9   TOTAL BILIRUBIN mg/dL 0 51 0 28   AMMONIA umol/L  --  86*         Results from last 7 days   Lab Units 05/31/22  0530 05/30/22  0516 05/27/22  1541   GLUCOSE RANDOM mg/dL 111 116 120             No results found for: BETA-HYDROXYBUTYRATE   Results from last 7 days   Lab Units 05/30/22  0527   PH ART  7 406   PCO2 ART mm Hg 35 4*   PO2 ART mm Hg 397 4*   HCO3 ART mmol/L 21 7*   BASE EXC ART mmol/L -2 3   O2 CONTENT ART mL/dL 22 5   O2 HGB, ARTERIAL % 98 2*     Results from last 7 days   Lab Units 05/30/22  0834   PH NERI  7 418*   PCO2 NERI mm Hg 36 9*   PO2 NERI mm Hg 115 2*   HCO3 NERI mmol/L 23 3*   BASE EXC NERI mmol/L -0 8   O2 CONTENT NERI ml/dL 21 5   O2 HGB, VENOUS % 96 7*         Results from last 7 days   Lab Units 05/30/22  0516   CK TOTAL U/L 231   CK MB INDEX % 1 0   CK MB ng/mL 2 4     Results from last 7 days   Lab Units 05/30/22  1022 05/30/22  0834 05/30/22  0516   HS TNI 0HR ng/L  --   --  3   HS TNI 2HR ng/L  --  3  --    HSTNI D2 ng/L  --  0  --    HS TNI 4HR ng/L 3  --   --    HSTNI D4 ng/L 0  --   --        Results from last 7 days   Lab Units 05/30/22  0516   LACTIC ACID mmol/L 1 2 Results from last 7 days   Lab Units 05/30/22  0516   LIPASE u/L 18       Results from last 7 days   Lab Units 05/30/22  0709 05/27/22  1541   CLARITY UA  Clear Clear   COLOR UA  Yellow Yellow   SPEC GRAV UA  1 020 1 015   PH UA  6 0 7 5   GLUCOSE UA mg/dl Negative Negative   KETONES UA mg/dl Negative Negative   BLOOD UA  Negative Negative   PROTEIN UA mg/dl Negative Negative   NITRITE UA  Negative Negative   BILIRUBIN UA  Negative Negative   UROBILINOGEN UA E U /dl 1 0 1 0   LEUKOCYTES UA  Negative Negative     Results from last 7 days   Lab Units 05/30/22  0844   AMPH/METH  Negative   BARBITURATE UR  Negative   BENZODIAZEPINE UR  Positive*   COCAINE UR  Negative   METHADONE URINE  Negative   OPIATE UR  Negative   PCP UR  Negative   THC UR  Negative     Results from last 7 days   Lab Units 05/30/22  0516   ETHANOL LVL mg/dL <10   ACETAMINOPHEN LVL ug/mL <51*   SALICYLATE LVL mg/dL <5     ED Treatment:   Medication Administration from 05/30/2022 0441 to 05/30/2022 0704       Date/Time Order Dose Route Action     05/30/2022 0521 ketamine (KETALAR) 50 mg/mL **ADS Override Pull** 200 mg  Given     05/30/2022 0509 propofol (DIPRIVAN) 1000 mg in 100 mL infusion (premix) 8 mcg/kg/min Intravenous New Bag     05/30/2022 0506 etomidate (AMIDATE) 2 mg/mL injection 20 mg 20 mg Intravenous Given     05/30/2022 0506 Succinylcholine Chloride 100 mg/5 mL syringe 125 mg 125 mg Intravenous Given     05/30/2022 0750 dexmedeTOMIDine (Precedex) 400 mcg in sodium chloride 0 9% 100 mL 0 2 mcg/kg/hr Intravenous New Bag          Admitting Diagnosis: Hypomagnesemia [E83 42]  Altered mental status [R41 82]  Encephalopathy [G93 40]  Closed head injury, initial encounter [S09 90XA]  Age/Sex: 30 y o  male  Admission Orders:  Elevate HOB, Up and OOB  SCDS    Scheduled Medications:  enoxaparin, 40 mg, Subcutaneous, Daily  famotidine, 20 mg, Oral, Daily  melatonin, 6 mg, Oral, HS  nicotine, 21 mg, Transdermal, Daily      Continuous IV Infusions:     PRN Meds:  acetaminophen, 650 mg, Oral, Q4H PRN        IP CONSULT TO CASE MANAGEMENT    Network Utilization Review Department  ATTENTION: Please call with any questions or concerns to [phone] and carefully listen to the prompts so that you are directed to the right person  All voicemails are confidential   [place] all requests for admission clinical reviews, approved or denied determinations and any other requests to dedicated fax number below belonging to the campus where the patient is receiving treatment   List of dedicated fax numbers for the Facilities:  [address] DENIALS (Administrative/Medical Necessity) [phone]   [address] (Maternity/NICU/Pediatrics) [phone]   [address]  [address] [facility] [address] [place] [id number] [place] [address] [address] [facility] [place] [address] [phone]

## 2022-05-31 NOTE — CASE MANAGEMENT
Case Management Assessment & Discharge Planning Note    Patient name Ayaan De La Garza  Location ICU 08/ICU 30-60 MRN [id number]  : 1985 Date 2022       Current Admission Date: 2022  Current Admission Diagnosis:Substance abuse [facility])   Patient Active Problem List    Diagnosis Date Noted    Substance abuse ([facility] [address] ) 2022    Hyperammonemia ([facility] [address] ) 2022    Medication therapy continued 2022    Smoker 2022    Left shoulder pain 2019    Muscle spasm 2019      LOS (days): 1  Geometric Mean LOS (GMLOS) (days):   Days to GMLOS:     OBJECTIVE:    Risk of Unplanned Readmission Score: 13 68     Current admission status: Inpatient  Preferred Pharmacy:   [address] [place] [address] [address]  Phone: [phone] Fax: [phone]    Primary Care Provider: Komal Ogden PA-C    Primary Insurance: BLUE CROSS  Secondary Insurance:     ASSESSMENT:  Breanna 26 Proxies    There are no active Health Care Proxies on file  Advance Directives  Does patient have a [address]?: No  Was patient offered paperwork?: Yes (Declined)  Does patient currently have a Health Care decision maker?: Yes, please see Health Care Proxy section  Does patient have Advance Directives?: No  Was patient offered paperwork?: Yes (Declined)  Primary Contact: [address] [place] [address] Court wife    Readmission Root Cause  30 Day Readmission: No    Patient Information  Admitted from[de-identified] Home  Mental Status: Alert  During Assessment patient was accompanied by: Not accompanied during assessment  Assessment information provided by[de-identified] Patient  Primary Caregiver: Self  Support Systems: Spouse/significant other  [place] of Residence: [address] do you live in?: [address]  entry access options   Select all that apply : Stairs  Number of steps to enter home : 2  Do the steps have railings?: No  Type of Current Residence: 2 story home  Upon entering residence, is there a bedroom on the main floor (no further steps)?: Yes  Upon entering residence, is there a bathroom on the main floor (no further steps)?: Yes  In the last 12 months, was there a time when you were not able to pay the mortgage or rent on time?: No  In the last 12 months, how many places have you lived?: 1  In the last 12 months, was there a time when you did not have a steady place to sleep or slept in a shelter (including now)?: No  Homeless/housing insecurity resource given?: N/A  Living Arrangements: Lives w/ Spouse/significant other  Is patient a ?: No    Activities of Daily Living Prior to Admission  Functional Status: Independent  Completes ADLs independently?: Yes  Ambulates independently?: Yes  Does patient use assisted devices?: No  Does patient currently own DME?: No  Does patient have a history of Outpatient Therapy (PT/OT)?: No  Does the patient have a history of Short-Term Rehab?: No  Does patient have a history of HH?: No  Does patient currently have [employer] ?: No         Patient Information Continued  Income Source: Employed  Does patient have prescription coverage?: Yes  Within the past 12 months, you worried that your food would run out before you got the money to buy more : Never true  Within the past 12 months, the food you bought just didn't last and you didn't have money to get more : Never true  Food insecurity resource given?: N/A  Does patient receive dialysis treatments?: No  Does patient have a history of substance abuse?: No, Yes, Currently using  Current substance use preference: Heroin  Historical substance use preference: Heroin  History of Withdrawal Symptoms: Other withdrawal symptoms (specify in comment)  Is patient currently in treatment for substance abuse?: Yes (Started [facility] program)  Does patient have a history of Mental Health Diagnosis?: No    PHQ 2/9 Screening   Reviewed PHQ 2/9 Depression Screening Score?: No    Means of Transportation  Means of Transport to Appts[de-identified] Drives Self  In the past 12 months, has lack of transportation kept you from medical appointments or from getting medications?: No  In the past 12 months, has lack of transportation kept you from meetings, work, or from getting things needed for daily living?: No  Was application for public transport provided?: N/A        DISCHARGE DETAILS:    Discharge planning discussed with[de-identified] Pt    Would you like to participate in our [facility] service program?  : No - Declined    Treatment Team Recommendation: Home  Discharge Destination Plan[de-identified] Home  Transport at Discharge : Family

## 2022-05-31 NOTE — ASSESSMENT & PLAN NOTE
· Related to narcotic withdrawal vs substance abuse? ?  · Coma panel unremarkable  · UDS positive for barbituates  · CT head unremarkable   · Regulate sleep/wake  · Delirium precuations

## 2022-05-31 NOTE — DISCHARGE SUMMARY
Ilya 47  Discharge- [facility] 1985, 30 y o  male MRN: [id number]  Unit/Bed#: ICU 08-01 Encounter: [id number]  Primary Care Provider: Emiliano Sagastume PA-C   Date and time admitted to hospital: 5/30/2022  4:57 AM    * Acute respiratory failure with hypoxia (HCC)-resolved as of 5/31/2022  Assessment & Plan  · Resolved  · Intubated for airway protection given toxic metabolic encephalopathy and agitation  · Extubated shortly after arrival to ICU  · Now on RA    Toxic metabolic encephalopathy-resolved as of 5/31/2022  Assessment & Plan  · Related to narcotic withdrawal vs substance abuse? ?  · Coma panel unremarkable  · UDS positive for benzos after receiving Versed in ED  · CT head unremarkable   · Regulate sleep/wake  · Delirium precuations    Hyperammonemia (HCC)  Assessment & Plan  · Unclear etiology, CT shows fatty liver, LFTs unremarkable    Leukocytosis-resolved as of 5/31/2022  Assessment & Plan  · Likely reactive, do not suspect any current infection  · UA, CXR, and CT AP unremarkable  · Monitor off abx    Substance abuse ([facility] [address] )  Assessment & Plan  · Hx heroin abuse, most recently receiving suboxone, unclear if compliant/taking appropriately  · Denied transfer to detox center  · Patient can follow up with outpatient  for withdrawal symptoms              Medical Problems             Resolved Problems  Date Reviewed: 5/31/2022          Resolved    * (Principal) Acute respiratory failure with hypoxia ([facility] [address] ) 5/31/2022     Resolved by  NICOLE Pinedo    Leukocytosis 5/31/2022     Resolved by  NICOLE Pinedo    Toxic metabolic encephalopathy 0/80/8815     Resolved by  NICOLE Pinedo                Admission Date:   Admission Orders (From admission, onward)     Ordered        05/30/22 0746  INPATIENT ADMISSION  Once                        Admitting Diagnosis: Hypomagnesemia [E83 42]  Altered mental status [R41 82]  Encephalopathy [G93 40]  Closed head injury, initial encounter [S09 90XA]    HPI:   As per Genie Ballards YOAN and Chacorta Alejandra is a 30 y o  male who presents with agitation and AMS  He has PMH substance abuse with heroin, recently started suboxone therapy  He was recently reffered to IP detox on 5/27 however refused and left the ED AMA  He returns this AM after s/o other called EMS as the patient was agitated, encephalopathic, and falling with head strike  He received versed and ketamine en route  In the ED trauma alert was called, and the patient was intubated for airway protection in order to undergo workup  Trauma workup was unrevealing  Labs unremarkable  He will be admitted to ICU for further workup and management  Procedures Performed:   Orders Placed This Encounter   Procedures   [address] ED ECG Documentation Only       Summary of Hospital Course:  Extubated to RA shortly after arriving to the ICU  No acute events overnight  Patient does not want inpatient detox  Remained hemodynamically stable for discharge  Significant Findings, Care, Treatment and Services Provided:   5/30 CT head negative  5/30 CT c spine negative   5/30 CT CAP: Tiny dependent pleural effusions suspected with associated passive atelectasis  Fatty infiltration of the liver is suspected  Grade 1 spondylolisthesis of L5/S1  Complications: N/A    Condition at Discharge: stable         Discharge instructions/Information to patient and family:   See after visit summary for information provided to patient and family  Provisions for Follow-Up Care:  See after visit summary for information related to follow-up care and any pertinent home health orders  PCP: Hua Allen PA-C    Disposition: Home    Planned Readmission: No    Discharge Statement   I spent 15 minutes discharging the patient  This time was spent on the day of discharge  I had direct contact with the patient on the day of discharge   Additional documentation is required if more than 30 minutes were spent on discharge  Discharge Medications:  See after visit summary for reconciled discharge medications provided to patient and family

## 2022-05-31 NOTE — PROGRESS NOTES
[address]  Progress Note Jennifer Bernardo 1985, 30 y o  male MRN: [id number]  Unit/Bed#: ICU 08-01 Encounter: [id number]  Primary Care Provider: Clara Muñoz PA-C   Date and time admitted to hospital: 5/30/2022  4:57 AM    * Acute respiratory failure with hypoxia ([facility] [address] )  Assessment & Plan  · Resolved  · Intubated for airway protection given toxic metabolic encephalopathy and agitation  · Extubated shortly after arrival to ICU  · Now on RA    Toxic metabolic encephalopathy  Assessment & Plan  · Related to narcotic withdrawal vs substance abuse? ?  · Coma panel unremarkable  · UDS positive for benzos after receiving Versed in ED  · CT head unremarkable   · Regulate sleep/wake  · Delirium precuations    Hyperammonemia ([facility] [address] )  Assessment & Plan  · Unclear etiology, CT shows fatty liver, LFTs unremarkable    Leukocytosis  Assessment & Plan  · Likely reactive, do not suspect any current infection  · UA, CXR, and CT AP unremarkable  · Monitor off abx    Substance abuse ([facility] [address] )  Assessment & Plan  · Hx heroin abuse, most recently receiving suboxone, unclear if compliant/taking appropriately  · Denied transfer to detox center  · Consider toxicology consult today for recommendations on discharge? · Otherwise patient can follow up with outpatient  for withdrawal symptoms      ----------------------------------------------------------------------------------------  HPI/24hr events: Extubated  No other events  Patient appropriate for transfer out of the ICU today?: Patient does not meet criteria for referral to the ICU Follow-Up Clinic; referral has not been made  Disposition: Discharge to home   Code Status: Level 1 - Full Code  ---------------------------------------------------------------------------------------  SUBJECTIVE  Offers no complaints    Review of Systems   Respiratory: Negative for shortness of breath  Cardiovascular: Negative for chest pain  Gastrointestinal: Negative for abdominal pain  Neurological: Negative for dizziness and light-headedness  All other systems reviewed and are negative  Review of systems was reviewed and negative unless stated above in HPI/24-hour events   ---------------------------------------------------------------------------------------  OBJECTIVE    Vitals   Vitals:    22 0200 22 0300 22 0400 22 0532   BP: 136/75 133/77 130/68    Pulse: 56 56 (!) 52    Resp: 14 12 16    Temp:       TempSrc:       SpO2: 96% 94% 96%    Weight:    109 kg (239 lb 6 7 oz)   Height:         Temp (24hrs), Av 6 °F (36 4 °C), Min:97 °F (36 1 °C), Max:97 9 °F (36 6 °C)  Current: Temperature: 97 9 °F (36 6 °C)          Respiratory:  SpO2: SpO2: 96 %, SpO2 Activity: SpO2 Activity: At Rest, SpO2 Device: O2 Device: Nasal cannula  Nasal Cannula O2 Flow Rate (L/min): 2 L/min    Invasive/non-invasive ventilation settings   Respiratory  Report   Lab Data (Last 4 hours)    None         O2/Vent Data (Last 4 hours)    None                Physical Exam  Vitals and nursing note reviewed  HENT:      Head: Normocephalic  Mouth/Throat:      Mouth: Mucous membranes are moist       Pharynx: Oropharynx is clear  Eyes:      Pupils: Pupils are equal, round, and reactive to light  Cardiovascular:      Rate and Rhythm: Normal rate and regular rhythm  Pulses: Normal pulses  Heart sounds: Normal heart sounds  Pulmonary:      Effort: Pulmonary effort is normal       Breath sounds: Normal breath sounds  Abdominal:      General: Bowel sounds are normal       Palpations: Abdomen is soft  Musculoskeletal:         General: Normal range of motion  Cervical back: Normal range of motion  Skin:     General: Skin is warm and dry  Neurological:      General: No focal deficit present  Mental Status: He is alert and oriented to person, place, and time               Laboratory and Diagnostics:  Results from last 7 days   Lab Units 05/31/22  0530 05/30/22  0516 05/27/22  1541   WBC Thousand/uL 9 25 10 80* 10 62*   HEMOGLOBIN g/dL 14 7 14 1 14 9   HEMATOCRIT % 45 2 42 8 45 6   PLATELETS Thousands/uL 247 235 245   NEUTROS PCT % 59 73 79*   MONOS PCT % 7 8 6     Results from last 7 days   Lab Units 05/30/22  0516 05/27/22  1541   SODIUM mmol/L 139 140   POTASSIUM mmol/L 3 5 3 6   CHLORIDE mmol/L 105 102   CO2 mmol/L 22 29   ANION GAP mmol/L 12 9   BUN mg/dL 20 16   CREATININE mg/dL 1 24 1 29   CALCIUM mg/dL 9 4 9 7   GLUCOSE RANDOM mg/dL 116 120   ALT U/L 46  --    AST U/L 25  --    ALK PHOS U/L 76  --    ALBUMIN g/dL 3 9  --    TOTAL BILIRUBIN mg/dL 0 28  --      Results from last 7 days   Lab Units 05/30/22  0516   MAGNESIUM mg/dL 1 8*               Results from last 7 days   Lab Units 05/30/22  0516   LACTIC ACID mmol/L 1 2     ABG:  Results from last 7 days   Lab Units 05/30/22  0527   PH ART  7 406   PCO2 ART mm Hg 35 4*   PO2 ART mm Hg 397 4*   HCO3 ART mmol/L 21 7*   BASE EXC ART mmol/L -2 3     VBG:  Results from last 7 days   Lab Units 05/30/22  0834   PH NERI  7 418*   PCO2 NERI mm Hg 36 9*   PO2 NERI mm Hg 115 2*   HCO3 NERI mmol/L 23 3*   BASE EXC NERI mmol/L -0 8           Micro        EKG: normal sinus rhythm  Imaging: I have personally reviewed pertinent reports  and I have personally reviewed pertinent films in PACS    Intake and Output  I/O       05/29 0701 05/30 0700 05/30 0701  05/31 0700    P  O   1220    I V  (mL/kg)  149 1 (1 4)    IV Piggyback  50    Total Intake(mL/kg)  1419 1 (13)    Urine (mL/kg/hr)  900 (0 3)    Total Output  900    Net  +519 1          Unmeasured Urine Occurrence  5 x    Unmeasured Stool Occurrence  0 x          Height and Weights   Height: 6' 1" (185 4 cm)  IBW (Ideal Body Weight): 79 9 kg  Body mass index is 31 59 kg/m²    Weight (last 2 days)     Date/Time Weight    05/31/22 0532 109 (239 42)     Weight: bed cleared of clutter at 05/31/22 0532    05/30/22 05:12:34 114 (250 66) Nutrition       Diet Orders   (From admission, onward)             Start     Ordered    05/30/22 0944  Diet Regular; Regular House  Diet effective now        References:    Nutrtion Support Algorithm Enteral vs  Parenteral   Question Answer Comment   Diet Type Regular    Regular Regular House    RD to adjust diet per protocol? Yes        05/30/22 0943                  Active Medications  Scheduled Meds:  Current Facility-Administered Medications   Medication Dose Route Frequency Provider Last Rate    acetaminophen  650 mg Oral Q4H PRN NICOLE Mendoza      enoxaparin  40 mg Subcutaneous Daily Alysa L Joseph, NICOLE      famotidine  20 mg Oral Daily Alysa L Tonibert, NICOLE      melatonin  6 mg Oral HS Mohit Dienes, IONANP      nicotine  21 mg Transdermal Daily Roselle Dienes, CRNP       Continuous Infusions:     PRN Meds:   acetaminophen, 650 mg, Q4H PRN        Invasive Devices Review  Invasive Devices  Report    Peripheral Intravenous Line  Duration           Peripheral IV 05/31/22 Dorsal (posterior); Right Wrist <1 day                Rationale for remaining devices: n/a  ---------------------------------------------------------------------------------------  Advance Directive and Living Will:      Power of :    POLST:    ---------------------------------------------------------------------------------------  Care Time Delivered:   No Critical Care time spent       NICOLE Lubin      Portions of the record may have been created with voice recognition software  Occasional wrong word or "sound a like" substitutions may have occurred due to the inherent limitations of voice recognition software    Read the chart carefully and recognize, using context, where substitutions have occurred

## 2022-05-31 NOTE — NURSING NOTE
[id number] Received report from nightshift RN  RN assessment performed and documented in flowsheets  See MAR for medication administration

## 2022-05-31 NOTE — ASSESSMENT & PLAN NOTE
· Hx heroin abuse, most recently receiving suboxone, unclear if compliant/taking appropriately  · Denied transfer to detox center  · Patient can follow up with outpatient  for withdrawal symptoms

## 2022-05-31 NOTE — ASSESSMENT & PLAN NOTE
· Related to narcotic withdrawal vs substance abuse? ?  · Coma panel unremarkable  · UDS positive for benzos after receiving Versed in ED  · CT head unremarkable   · Regulate sleep/wake  · Delirium precuations

## 2022-05-31 NOTE — ASSESSMENT & PLAN NOTE
· Hx heroin abuse, most recently receiving suboxone, unclear if compliant/taking appropriately  · Denied transfer to detox center  · Consider toxicology consult today for recommendations on discharge?   · Otherwise patient can follow up with outpatient  for withdrawal symptoms

## 2022-05-31 NOTE — PLAN OF CARE
Problem: MOBILITY - ADULT  Goal: Maintain or return to baseline ADL function  Description: INTERVENTIONS:  -  Assess patient's ability to carry out ADLs; assess patient's baseline for ADL function and identify physical deficits which impact ability to perform ADLs (bathing, care of mouth/teeth, toileting, grooming, dressing, etc )  - Assess/evaluate cause of self-care deficits   - Assess range of motion  - Assess patient's mobility; develop plan if impaired  - Assess patient's need for assistive devices and provide as appropriate  - Encourage maximum independence but intervene and supervise when necessary  - Involve family in performance of ADLs  - Assess for home care needs following discharge   - Consider OT consult to assist with ADL evaluation and planning for discharge  - Provide patient education as appropriate  Outcome: Progressing  Goal: Maintains/Returns to pre admission functional level  Description: INTERVENTIONS:  - Perform BMAT or MOVE assessment daily    - Set and communicate daily mobility goal to care team and patient/family/caregiver     - Collaborate with rehabilitation services on mobility goals if consulted  - Record patient progress and toleration of activity level   Outcome: Progressing     Problem: Potential for Falls  Goal: Patient will remain free of falls  Description: INTERVENTIONS:  - Educate patient/family on patient safety including physical limitations  - Instruct patient to call for assistance with activity   - Consult OT/PT to assist with strengthening/mobility   - Keep Call bell within reach  - Keep bed low and locked with side rails adjusted as appropriate  - Keep care items and personal belongings within reach  - Initiate and maintain comfort rounds  - Make Fall Risk Sign visible to staff  - Apply yellow socks and bracelet for high fall risk patients  - Consider moving patient to room near nurses station  Outcome: Progressing     Problem: Prexisting or High Potential for Compromised Skin Integrity  Goal: Skin integrity is maintained or improved  Description: INTERVENTIONS:  - Identify patients at risk for skin breakdown  - Assess and monitor skin integrity  - Assess and monitor nutrition and hydration status  - Monitor labs   - Assess for incontinence   - Turn and reposition patient  - Assist with mobility/ambulation  - Relieve pressure over bony prominences  - Avoid friction and shearing  - Provide appropriate hygiene as needed including keeping skin clean and dry  - Evaluate need for skin moisturizer/barrier cream  - Collaborate with interdisciplinary team   - Patient/family teaching  - Consider wound care consult   Outcome: Progressing

## 2022-05-31 NOTE — ED PROCEDURE NOTE
PROCEDURE  CriticalCare Time  Performed by: Jeimy Gimenez MD  Authorized by: Jeimy Gimenez MD     Critical care provider statement:     Critical care time (minutes):  95    Critical care start time:  5/30/2022 4:49 AM    Critical care end time:  5/30/2022 7:00 AM    Critical care time was exclusive of:  Separately billable procedures and treating other patients    Critical care was necessary to treat or prevent imminent or life-threatening deterioration of the following conditions:  CNS failure or compromise, respiratory failure and dehydration    Critical care was time spent personally by me on the following activities:  Examination of patient, evaluation of patient's response to treatment, discussions with consultants, discussions with primary provider, development of treatment plan with patient or surrogate, obtaining history from patient or surrogate, ordering and performing treatments and interventions, ordering and review of laboratory studies, ordering and review of radiographic studies, re-evaluation of patient's condition, review of old charts and ventilator management    I assumed direction of critical care for this patient from another provider in my specialty: carolina Gimenez MD  05/31/22 8133 [facility] Reina Blum MD  05/31/22 9206

## 2022-05-31 NOTE — ASSESSMENT & PLAN NOTE
· Resolved  · Intubated for airway protection given toxic metabolic encephalopathy and agitation  · Extubated shortly after arrival to ICU  · Now on RA

## 2022-06-01 NOTE — UTILIZATION REVIEW
Notification of Discharge   This is a Notification of Discharge from our facility [address]  Please be advised that this patient has been discharge from our facility  Below you will find the admission and discharge date and time including the patients disposition  UTILIZATION REVIEW CONTACT:  Elijah Steen  Utilization   Network Utilization Review Department  Phone: [phone] carefully listen to the prompts  All voicemails are confidential   Email: [email or web address]     PHYSICIAN ADVISORY SERVICES:  FOR [id number]-[id number] REVIEW - MEDICAL NECESSITY DENIAL  Phone: [phone]  Fax: [phone]  Email: [email or web address]     PRESENTATION DATE: 5/30/2022  4:57 AM  OBERVATION ADMISSION DATE:  INPATIENT ADMISSION DATE: 5/30/22  7:46 AM   DISCHARGE DATE: 5/31/2022  9:57 AM  DISPOSITION: Home/Self Care Home/Self Care      IMPORTANT INFORMATION:  Send all requests for admission clinical reviews, approved or denied determinations and any other requests to dedicated fax number below belonging to the campus where the patient is receiving treatment   List of dedicated fax numbers:  [address] DENIALS (Administrative/Medical Necessity) [phone]   [address] (Maternity/NICU/Pediatrics) [phone]   Asad Tobin [phone]   130 [facility] [phone]   [address] [place] [address] [phone]   [address] [phone]   [facility]  [phone]   220[address] [facility]  [address] [phone]

## 2022-06-08 LAB
6MAM UR QL CFM: NEGATIVE NG/ML
7AMINOCLONAZEPAM UR QL CFM: NEGATIVE NG/ML
A-OH ALPRAZ UR QL CFM: ABNORMAL NG/ML
ACCEPTABLE CREAT UR QL: NORMAL MG/DL
ACCEPTIBLE SP GR UR QL: NORMAL
AMPHET UR QL CFM: NEGATIVE NG/ML
BUPRENORPHINE UR QL CFM: NEGATIVE NG/ML
BUTALBITAL UR QL CFM: NEGATIVE NG/ML
BZE UR QL CFM: NEGATIVE NG/ML
CODEINE UR QL CFM: NEGATIVE NG/ML
EDDP UR QL CFM: NEGATIVE NG/ML
EUTYLONE UR QL: NEGATIVE NG/ML
FENTANYL UR QL CFM: ABNORMAL NG/ML
GLIADIN IGG SER IA-ACNC: NEGATIVE NG/ML
HYDROCODONE UR QL CFM: NEGATIVE NG/ML
HYDROMORPHONE UR QL CFM: NEGATIVE NG/ML
LORAZEPAM UR QL CFM: NEGATIVE NG/ML
ME-PHENIDATE UR QL CFM: NEGATIVE NG/ML
MEPERIDINE UR QL CFM: NEGATIVE NG/ML
METHADONE UR QL CFM: NEGATIVE NG/ML
METHAMPHET UR QL CFM: NEGATIVE NG/ML
MORPHINE UR QL CFM: NEGATIVE NG/ML
NALTREXONE UR QL CFM: NEGATIVE NG/ML
NITRITE UR QL: NORMAL UG/ML
NORBUPRENORPHINE UR QL CFM: NEGATIVE NG/ML
NORDIAZEPAM UR QL CFM: NEGATIVE NG/ML
NORFENTANYL UR QL CFM: ABNORMAL NG/ML
NORHYDROCODONE UR QL CFM: NEGATIVE NG/ML
NORMEPERIDINE UR QL CFM: NEGATIVE NG/ML
NOROXYCODONE UR QL CFM: ABNORMAL NG/ML
OXAZEPAM UR QL CFM: NEGATIVE NG/ML
OXYCODONE UR QL CFM: ABNORMAL NG/ML
OXYMORPHONE UR QL CFM: NEGATIVE NG/ML
OXYMORPHONE UR QL CFM: NEGATIVE NG/ML
PHENOBARB UR QL CFM: NEGATIVE NG/ML
RESULT ALL_PRESCRIBED MEDS AND SPECIAL INSTRUCTIONS: NORMAL
SECOBARBITAL UR QL CFM: NEGATIVE NG/ML
SL AMB 3-METHYL-FENTANYL QUANTIFICATION: NEGATIVE NG/ML
SL AMB 4-ANPP QUANTIFICATION: NEGATIVE NG/ML
SL AMB 4-FIBF QUANTIFICATION: NEGATIVE NG/ML
SL AMB 5F-ADB-M7 METABOLITE QUANTIFICATION: NEGATIVE NG/ML
SL AMB 7-OH-MITRAGYNINE (KRATOM ALKALOID) QUANTIFICATION: NEGATIVE NG/ML
SL AMB AB-FUBINACA-M3 METABOLITE QUANTIFICATION: NEGATIVE NG/ML
SL AMB ACETYL FENTANYL QUANTIFICATION: NEGATIVE NG/ML
SL AMB ACETYL NORFENTANYL QUANTIFICATION: NEGATIVE NG/ML
SL AMB ACRYL FENTANYL QUANTIFICATION: NEGATIVE NG/ML
SL AMB BUTRYL FENTANYL QUANTIFICATION: NEGATIVE NG/ML
SL AMB CARFENTANIL QUANTIFICATION: NEGATIVE NG/ML
SL AMB CYCLOPROPYL FENTANYL QUANTIFICATION: NEGATIVE NG/ML
SL AMB DEXTRORPHAN (DEXTROMETHORPHAN METABOLITE) QUANT: NEGATIVE NG/ML
SL AMB FURANYL FENTANYL QUANTIFICATION: NEGATIVE NG/ML
SL AMB GABAPENTIN QUANTIFICATION: NEGATIVE
SL AMB JWH018 METABOLITE QUANTIFICATION: NEGATIVE NG/ML
SL AMB JWH073 METABOLITE QUANTIFICATION: NEGATIVE NG/ML
SL AMB MDMB-FUBINACA-M1 METABOLITE QUANTIFICATION: NEGATIVE NG/ML
SL AMB METHOXYACETYL FENTANYL QUANTIFICATION: NEGATIVE NG/ML
SL AMB METHYLONE QUANTIFICATION: NEGATIVE NG/ML
SL AMB N-DESMETHYL U-47700 QUANTIFICATION: NEGATIVE NG/ML
SL AMB N-DESMETHYL-TRAMADOL QUANTIFICATION: NEGATIVE NG/ML
SL AMB PHENTERMINE QUANTIFICATION: NEGATIVE NG/ML
SL AMB PREGABALIN QUANTIFICATION: NEGATIVE
SL AMB RCS4 METABOLITE QUANTIFICATION: NEGATIVE NG/ML
SL AMB RITALINIC ACID QUANTIFICATION: NEGATIVE NG/ML
SL AMB U-47700 QUANTIFICATION: NEGATIVE NG/ML
SMOOTH MUSCLE AB TITR SER IF: NEGATIVE NG/ML
SPECIMEN DRAWN SERPL: NEGATIVE NG/ML
SPECIMEN PH ACCEPTABLE UR: NORMAL
TAPENTADOL UR QL CFM: NEGATIVE NG/ML
TEMAZEPAM UR QL CFM: NEGATIVE NG/ML
TEMAZEPAM UR QL CFM: NEGATIVE NG/ML
TRAMADOL UR QL CFM: NEGATIVE NG/ML
URATE/CREAT 24H UR: NEGATIVE NG/ML

## 2022-10-31 ENCOUNTER — HOSPITAL ENCOUNTER (EMERGENCY)
Facility: HOSPITAL | Age: 37
Discharge: HOME | End: 2022-10-31
Attending: EMERGENCY MEDICINE

## 2022-10-31 VITALS
HEIGHT: 73 IN | HEART RATE: 83 BPM | BODY MASS INDEX: 31.68 KG/M2 | WEIGHT: 239 LBS | TEMPERATURE: 98.6 F | OXYGEN SATURATION: 94 % | RESPIRATION RATE: 16 BRPM | SYSTOLIC BLOOD PRESSURE: 121 MMHG | DIASTOLIC BLOOD PRESSURE: 74 MMHG

## 2022-10-31 DIAGNOSIS — M54.50 ACUTE LOW BACK PAIN: Primary | ICD-10-CM

## 2022-10-31 RX ORDER — KETOROLAC TROMETHAMINE 30 MG/ML
30 INJECTION, SOLUTION INTRAMUSCULAR; INTRAVENOUS ONCE
Status: COMPLETED | OUTPATIENT
Start: 2022-10-31 | End: 2022-10-31

## 2022-10-31 RX ORDER — DEXAMETHASONE SODIUM PHOSPHATE 4 MG/ML
8 INJECTION, SOLUTION INTRA-ARTICULAR; INTRALESIONAL; INTRAMUSCULAR; INTRAVENOUS; SOFT TISSUE ONCE
Status: COMPLETED | OUTPATIENT
Start: 2022-10-31 | End: 2022-10-31

## 2022-10-31 RX ADMIN — KETOROLAC TROMETHAMINE 30 MG: 30 INJECTION, SOLUTION INTRAMUSCULAR at 10:02

## 2022-10-31 RX ADMIN — DEXAMETHASONE SODIUM PHOSPHATE 8 MG: 4 INJECTION, SOLUTION INTRAMUSCULAR; INTRAVENOUS at 10:03

## 2022-11-01 ENCOUNTER — TELEPHONE (OUTPATIENT)
Dept: PHYSICAL THERAPY | Facility: OTHER | Age: 37
End: 2022-11-01

## 2024-06-22 ENCOUNTER — APPOINTMENT (OUTPATIENT)
Dept: URGENT CARE | Facility: CLINIC | Age: 39
End: 2024-06-22

## 2025-06-19 ENCOUNTER — APPOINTMENT (EMERGENCY)
Dept: RADIOLOGY | Facility: HOSPITAL | Age: 40
End: 2025-06-19
Payer: COMMERCIAL

## 2025-06-19 ENCOUNTER — HOSPITAL ENCOUNTER (EMERGENCY)
Facility: HOSPITAL | Age: 40
Discharge: HOME | End: 2025-06-19
Attending: EMERGENCY MEDICINE
Payer: COMMERCIAL

## 2025-06-19 VITALS
HEART RATE: 76 BPM | SYSTOLIC BLOOD PRESSURE: 117 MMHG | WEIGHT: 230 LBS | BODY MASS INDEX: 31.15 KG/M2 | OXYGEN SATURATION: 94 % | DIASTOLIC BLOOD PRESSURE: 76 MMHG | HEIGHT: 72 IN | TEMPERATURE: 98.5 F | RESPIRATION RATE: 16 BRPM

## 2025-06-19 DIAGNOSIS — J18.9 PNEUMONIA: Primary | ICD-10-CM

## 2025-06-19 DIAGNOSIS — J40 BRONCHITIS: ICD-10-CM

## 2025-06-19 LAB
FLUAV RNA RESP QL NAA+PROBE: NEGATIVE
FLUBV RNA RESP QL NAA+PROBE: NEGATIVE
RSV RNA RESP QL NAA+PROBE: NEGATIVE
SARS-COV-2 RNA RESP QL NAA+PROBE: NEGATIVE

## 2025-06-19 PROCEDURE — 94640 AIRWAY INHALATION TREATMENT: CPT

## 2025-06-19 PROCEDURE — 96372 THER/PROPH/DIAG INJ SC/IM: CPT

## 2025-06-19 PROCEDURE — 99285 EMERGENCY DEPT VISIT HI MDM: CPT

## 2025-06-19 PROCEDURE — 71045 X-RAY EXAM CHEST 1 VIEW: CPT

## 2025-06-19 PROCEDURE — 99284 EMERGENCY DEPT VISIT MOD MDM: CPT | Performed by: EMERGENCY MEDICINE

## 2025-06-19 PROCEDURE — 0241U HB NFCT DS VIR RESP RNA 4 TRGT: CPT | Performed by: EMERGENCY MEDICINE

## 2025-06-19 RX ORDER — DEXAMETHASONE SODIUM PHOSPHATE 10 MG/ML
8 INJECTION, SOLUTION INTRAMUSCULAR; INTRAVENOUS ONCE
Status: COMPLETED | OUTPATIENT
Start: 2025-06-19 | End: 2025-06-19

## 2025-06-19 RX ORDER — ALBUTEROL SULFATE 90 UG/1
2 INHALANT RESPIRATORY (INHALATION) ONCE
Status: COMPLETED | OUTPATIENT
Start: 2025-06-19 | End: 2025-06-19

## 2025-06-19 RX ORDER — IPRATROPIUM BROMIDE AND ALBUTEROL SULFATE 2.5; .5 MG/3ML; MG/3ML
3 SOLUTION RESPIRATORY (INHALATION) ONCE
Status: COMPLETED | OUTPATIENT
Start: 2025-06-19 | End: 2025-06-19

## 2025-06-19 RX ADMIN — IPRATROPIUM BROMIDE AND ALBUTEROL SULFATE 3 ML: 2.5; .5 SOLUTION RESPIRATORY (INHALATION) at 19:50

## 2025-06-19 RX ADMIN — ALBUTEROL SULFATE 2 PUFF: 90 AEROSOL, METERED RESPIRATORY (INHALATION) at 21:15

## 2025-06-19 RX ADMIN — DEXAMETHASONE SODIUM PHOSPHATE 8 MG: 10 INJECTION, SOLUTION INTRAMUSCULAR; INTRAVENOUS at 21:13

## 2025-06-19 NOTE — ED PROVIDER NOTES
Time reflects when diagnosis was documented in both MDM as applicable and the Disposition within this note       Time User Action Codes Description Comment    6/19/2025  9:02 PM Domingo Dominguez [J18.9] Pneumonia     6/19/2025  9:02 PM Domingo Dominguez [J40] Bronchitis           ED Disposition       ED Disposition   Discharge    Condition   Stable    Date/Time   Thu Jun 19, 2025  9:02 PM    Comment   Marco Mckeon discharge to home/self care.                   Assessment & Plan       Medical Decision Making  40-year-old male with history of tobacco abuse presents emergency room complaining of cough congestion wheezing and shortness of breath.  Patient denies sick contacts or any fever or chills.  Patient stopped smoking cigarettes yesterday due to illness.  Patient came to the ER because he has no family doctor.  Differential diagnosis based on my evaluation is viral syndrome, bronchitis/bronchospasm, or pneumonia.  The patient otherwise has stable vital signs.  Chest x-ray shows increased hilar prominence which may be a left hilar infiltrate.  For that reason antibiotics have been ordered.  Patient be given albuterol inhaler to go and told to follow-up with her family doctor for chronic medical care.  Patient was counseled about smoking and told that she he should return for any new or concerning issues.    Amount and/or Complexity of Data Reviewed  Radiology: ordered and independent interpretation performed.    Risk  Prescription drug management.             Medications   ipratropium-albuterol (DUO-NEB) 0.5-2.5 mg/3 mL inhalation solution 3 mL (3 mL Nebulization Given 6/19/25 1950)   dexamethasone (PF) (DECADRON) injection 8 mg (8 mg Intramuscular Given 6/19/25 2113)   albuterol (PROVENTIL HFA,VENTOLIN HFA) inhaler 2 puff (2 puffs Inhalation Given 6/19/25 2115)       ED Risk Strat Scores                    No data recorded                            History of Present Illness       Chief Complaint    Patient presents with    URI     Patient reports cough, congestion, and sometimes pain with inspiration.       Past Medical History[1]   Past Surgical History[2]   Family History[3]   Social History[4]   E-Cigarette/Vaping    E-Cigarette Use Never User       E-Cigarette/Vaping Substances    Nicotine No     THC No     CBD No     Flavoring No     Other No     Unknown No       I have reviewed and agree with the history as documented.     40-year-old male presents emergency room complaining of 4-day history of cough congestion and wheezing.  The patient is a smoker and has no primary care physician, and notes that his wife made him come into the ER for evaluation.  Patient denies fever chills or sick contacts.  Patient notes no cigarettes for 1 day.        Review of Systems   Constitutional:  Positive for activity change. Negative for chills and fever.   HENT:  Negative for ear pain and sore throat.    Eyes:  Negative for pain and visual disturbance.   Respiratory:  Positive for cough, shortness of breath and wheezing.    Cardiovascular:  Negative for chest pain and palpitations.   Gastrointestinal:  Negative for abdominal pain and vomiting.   Genitourinary:  Negative for dysuria and hematuria.   Musculoskeletal:  Negative for arthralgias and back pain.   Skin:  Negative for color change and rash.   Neurological:  Negative for syncope.   All other systems reviewed and are negative.          Objective       ED Triage Vitals [06/19/25 1848]   Temperature Pulse Blood Pressure Respirations SpO2 Patient Position - Orthostatic VS   98.5 °F (36.9 °C) 76 117/76 16 94 % Sitting      Temp Source Heart Rate Source BP Location FiO2 (%) Pain Score    Oral -- Left arm -- No Pain      Vitals      Date and Time Temp Pulse SpO2 Resp BP Pain Score FACES Pain Rating User   06/19/25 1848 98.5 °F (36.9 °C) 76 94 % 16 117/76 No Pain -- EMR            Physical Exam  Constitutional:       General: He is not in acute distress.     Appearance:  Normal appearance. He is normal weight. He is not ill-appearing.   HENT:      Head: Normocephalic and atraumatic.      Right Ear: External ear normal.      Left Ear: External ear normal.      Nose: Nose normal.      Mouth/Throat:      Mouth: Mucous membranes are moist.     Eyes:      Conjunctiva/sclera: Conjunctivae normal.       Cardiovascular:      Rate and Rhythm: Normal rate and regular rhythm.      Pulses: Normal pulses.      Heart sounds: Normal heart sounds.   Pulmonary:      Effort: Pulmonary effort is normal.      Breath sounds: Wheezing and rhonchi present.   Abdominal:      General: Abdomen is flat. There is no distension.      Palpations: Abdomen is soft. There is no mass.     Musculoskeletal:         General: No swelling, tenderness or deformity. Normal range of motion.      Cervical back: Normal range of motion.     Skin:     General: Skin is warm and dry.      Capillary Refill: Capillary refill takes 2 to 3 seconds.      Coloration: Skin is not pale.     Neurological:      General: No focal deficit present.      Mental Status: He is alert and oriented to person, place, and time. Mental status is at baseline.     Psychiatric:         Mood and Affect: Mood normal.         Results Reviewed       Procedure Component Value Units Date/Time    FLU/RSV/COVID - if FLU/RSV clinically relevant (2hr TAT) [[id number]]  (Normal) Collected: 06/19/25 1950    Lab Status: Final result Specimen: Nares from Nose Updated: 06/19/25 2032     SARS-CoV-2 Negative     INFLUENZA A PCR Negative     INFLUENZA B PCR Negative     RSV PCR Negative    Narrative:      This test has been performed using the CoV-2/Flu/RSV plus assay on the [employer] Gene[employer]pert platform. This test has been validated by the  and verified by the performing laboratory.     This test is designed to amplify and detect the following: nucleocapsid (N), envelope (E), and RNA-dependent RNA polymerase (RdRP) genes of the SARS-CoV-2 genome; matrix (M),  basic polymerase (PB2), and acidic protein (PA) segments of the influenza A genome; matrix (M) and non-structural protein (NS) segments of the influenza B genome, and the nucleocapsid genes of RSV A and RSV B.     Positive results are indicative of the presence of Flu A, Flu B, RSV, and/or SARS-CoV-2 RNA. Positive results for SARS-CoV-2 or suspected novel influenza should be reported to state, local, or federal health departments according to local reporting requirements.      All results should be assessed in conjunction with clinical presentation and other laboratory markers for clinical management.     FOR PEDIATRIC PATIENTS - copy/paste COVID Guidelines URL to browser: [email or web address]               XR chest 1 view portable   ED Interpretation by Domingo Dominguez Jr., DO (06/19 2015)   Left hilar infiltrate.      Final Interpretation by Amari Rico MD (06/19 2107)      No pneumothorax or lobar airspace consolidation. Mildly prominent perihilar reticular interstitial markings again noted bilaterally without significant interval change.            Workstation performed: [id number]             Procedures    ED Medication and Procedure Management   Prior to Admission Medications   Prescriptions Last Dose Informant Patient Reported? Taking?   buprenorphine-naloxone (Suboxone) 8-2 mg   No No   Sig: One or two strips sl q day.   citalopram (CeleXA) 20 mg tablet   No No   Sig: Take 1 tablet (20 mg total) by mouth daily   Patient not taking: No sig reported      Facility-Administered Medications: None     Discharge Medication List as of 6/19/2025  9:09 PM        START taking these medications    Details   amoxicillin-clavulanate (AUGMENTIN) 875-125 mg per tablet Take 1 tablet by mouth every 12 (twelve) hours for 7 days, Starting Thu 6/19/2025, Until Thu 6/26/2025, Normal           CONTINUE these medications which have NOT CHANGED    Details    buprenorphine-naloxone (Suboxone) 8-2 mg One or two strips sl q day., Normal      citalopram (CeleXA) 20 mg tablet Take 1 tablet (20 mg total) by mouth daily, Starting Wed 12/23/2020, Normal             ED SEPSIS DOCUMENTATION   Time reflects when diagnosis was documented in both MDM as applicable and the Disposition within this note       Time User Action Codes Description Comment    6/19/2025  9:02 PM Domingo Dominguez [J18.9] Pneumonia     6/19/2025  9:02 PM Domingo Dominguez [J40] Bronchitis                      [1] No past medical history on file.  [2]   Past Surgical History:  Procedure Laterality Date    HAND TENDON SURGERY     [3]   Family History  Problem Relation Name Age of Onset    Substance Abuse Mother      Alcohol abuse Father      Cirrhosis Father      Cirrhosis Maternal Grandfather      Throat cancer Family     [4]   Social History  Tobacco Use    Smoking status: Every Day     Current packs/day: 1.00     Average packs/day: 1 pack/day for 20.0 years (20.0 ttl pk-yrs)     Types: Cigarettes    Smokeless tobacco: Never   Vaping Use    Vaping status: Never Used   Substance Use Topics    Alcohol use: Not Currently     Comment: socially    Drug use: Not Currently     Types: Heroin     Comment: last use 1100 hours 5/26/2022        Domingo Dominguez Jr., DO  06/19/25 5615

## 2025-06-20 NOTE — DISCHARGE INSTRUCTIONS
Return to the ER for any new, concerning, worsening issues.  Please consider stopping smoking as you will have recurrent lung complaints and infections in the future from this.    Use albuterol 2 puffs every 6 hours whether you needed or not over the next 5 days.  After 5 days you may use albuterol every 6 hours as needed.    Use Augmentin 1 pill twice a day for a week.  Finish all antibiotics even if doing better.  Consider taking a probiotic or yogurt daily while on this medicine.    Return to the ER with any new, concerning, or worsening issues.  You should have a family doctor for chronic medical care.  I have contacted our scheduling team to set you up with a physician for follow-up.  Please do this as it is very important as to have chronic medical care.